# Patient Record
Sex: FEMALE | Race: BLACK OR AFRICAN AMERICAN | NOT HISPANIC OR LATINO | Employment: OTHER | URBAN - METROPOLITAN AREA
[De-identification: names, ages, dates, MRNs, and addresses within clinical notes are randomized per-mention and may not be internally consistent; named-entity substitution may affect disease eponyms.]

---

## 2018-03-06 ENCOUNTER — OFFICE VISIT (OUTPATIENT)
Dept: RHEUMATOLOGY | Facility: CLINIC | Age: 58
End: 2018-03-06
Payer: COMMERCIAL

## 2018-03-06 VITALS
HEIGHT: 66 IN | WEIGHT: 234 LBS | DIASTOLIC BLOOD PRESSURE: 108 MMHG | BODY MASS INDEX: 37.61 KG/M2 | SYSTOLIC BLOOD PRESSURE: 152 MMHG

## 2018-03-06 DIAGNOSIS — Z79.899 ENCOUNTER FOR LONG-TERM (CURRENT) DRUG USE: ICD-10-CM

## 2018-03-06 DIAGNOSIS — M19.90 OSTEOARTHRITIS, UNSPECIFIED OSTEOARTHRITIS TYPE, UNSPECIFIED SITE: ICD-10-CM

## 2018-03-06 DIAGNOSIS — M79.7 FIBROMYALGIA: Primary | ICD-10-CM

## 2018-03-06 LAB
ALBUMIN SERPL-MCNC: 4.7 G/DL (ref 3.1–4.7)
ALP SERPL-CCNC: 48 IU/L (ref 40–104)
ALT (SGPT): 23 IU/L (ref 3–33)
AST SERPL-CCNC: 19 IU/L (ref 10–40)
BASOPHILS NFR BLD: 0 K/UL (ref 0–0.2)
BASOPHILS NFR BLD: 0.6 %
BILIRUB SERPL-MCNC: 0.5 MG/DL (ref 0.3–1)
BUN SERPL-MCNC: 15 MG/DL (ref 8–20)
CALCIUM SERPL-MCNC: 9.7 MG/DL (ref 7.7–10.4)
CHLORIDE: 105 MMOL/L (ref 98–110)
CK SERPL-CCNC: 230 IU/L (ref 13–135)
CO2 SERPL-SCNC: 26.1 MMOL/L (ref 22.8–31.6)
CREATININE: 0.65 MG/DL (ref 0.6–1.4)
EOSINOPHIL NFR BLD: 0.1 K/UL (ref 0–0.7)
EOSINOPHIL NFR BLD: 1.4 %
ERYTHROCYTE [DISTWIDTH] IN BLOOD BY AUTOMATED COUNT: 12.8 % (ref 11.7–14.9)
GLUCOSE: 79 MG/DL (ref 70–99)
GRAN #: 3.6 K/UL (ref 1.4–6.5)
GRAN%: 56.5 %
HCT VFR BLD AUTO: 40.9 % (ref 36–48)
HGB BLD-MCNC: 13.6 G/DL (ref 12–15)
IMMATURE GRANS (ABS): 0 K/UL (ref 0–1)
IMMATURE GRANULOCYTES: 0.3 %
LYMPH #: 2.1 K/UL (ref 1.2–3.4)
LYMPH%: 33.3 %
MCH RBC QN AUTO: 31.6 PG (ref 25–35)
MCHC RBC AUTO-ENTMCNC: 33.3 G/DL (ref 31–36)
MCV RBC AUTO: 94.9 FL (ref 79–98)
MONO #: 0.5 K/UL (ref 0.1–0.6)
MONO%: 7.9 %
NUCLEATED RBCS: 0 %
PLATELET # BLD AUTO: 272 K/UL (ref 140–440)
PMV BLD AUTO: 11 FL (ref 8.8–12.7)
POTASSIUM SERPL-SCNC: 4.2 MMOL/L (ref 3.5–5)
PROT SERPL-MCNC: 8 G/DL (ref 6–8.2)
RBC # BLD AUTO: 4.31 M/UL (ref 3.5–5.5)
SODIUM: 139 MMOL/L (ref 134–144)
TSH SERPL DL<=0.005 MIU/L-ACNC: 1.55 ULU/ML (ref 0.3–5.6)
WBC # BLD AUTO: 6.3 K/UL (ref 5–10)

## 2018-03-06 PROCEDURE — 99204 OFFICE O/P NEW MOD 45 MIN: CPT | Mod: ,,, | Performed by: INTERNAL MEDICINE

## 2018-03-06 RX ORDER — OMEPRAZOLE 20 MG/1
20 CAPSULE, DELAYED RELEASE ORAL
COMMUNITY
End: 2018-08-14

## 2018-03-06 RX ORDER — VALSARTAN 320 MG/1
320 TABLET ORAL
COMMUNITY
End: 2018-08-14

## 2018-03-06 RX ORDER — CYCLOBENZAPRINE HCL 10 MG
10 TABLET ORAL 3 TIMES DAILY PRN
COMMUNITY

## 2018-03-06 RX ORDER — ESCITALOPRAM OXALATE 10 MG/1
10 TABLET ORAL
COMMUNITY
End: 2018-08-14 | Stop reason: SDUPTHER

## 2018-03-06 RX ORDER — LORAZEPAM 1 MG/1
1 TABLET ORAL EVERY 4 HOURS PRN
COMMUNITY
End: 2018-08-14

## 2018-03-06 RX ORDER — OXYCODONE AND ACETAMINOPHEN 10; 325 MG/1; MG/1
1 TABLET ORAL
COMMUNITY
End: 2019-10-23

## 2018-03-06 RX ORDER — METOPROLOL SUCCINATE 50 MG/1
50 TABLET, EXTENDED RELEASE ORAL
COMMUNITY
End: 2018-03-29 | Stop reason: SDUPTHER

## 2018-03-06 RX ORDER — TRAZODONE HYDROCHLORIDE 50 MG/1
50 TABLET ORAL NIGHTLY PRN
COMMUNITY
End: 2018-03-29

## 2018-03-06 RX ORDER — IBUPROFEN 800 MG/1
800 TABLET ORAL
COMMUNITY
End: 2018-03-29

## 2018-03-06 NOTE — PATIENT INSTRUCTIONS
Fibromyalgia  Fibromyalgia is a chronic condition. It causes pain and tenderness in connective tissues. This causes muscle pain. Often, there are also many tender areas throughout the body. Symptoms may also include stiffness and feelings of numbness and tingling. Symptoms may be worse upon waking up. They may increase with poor sleep, heavy activity, cold or damp weather, anxiety, or stress.  People with fibromyalgia often feel tired. They may have trouble sleeping. Other symptoms include morning stiffness, headaches, and painful menstrual periods. Some people have problems with thinking clearly and changes in memory.  The cause of fibromyalgia is not known. Symptoms are similar to that of other diseases. These include rheumatoid arthritis, low thyroid, chronic fatigue syndrome, and Lyme disease. In some cases, these diseases may occur together.  Fibromyalgia is often treated with medicines. You and your healthcare provider can discuss the medicine that may work best for you. You may have to try more than one medicine or combination of medicines before you find what works for you.  Home care  · If your healthcare provider has prescribed or recommended medicines, take them as directed.  · Rest as needed. Try to get enough sleep. If you have trouble sleeping, discuss this with your healthcare provider.   · Be active. Regular exercise can help manage symptoms. Some options include walking, swimming, and biking. Strengthening exercises may also be helpful. Talk to your healthcare provider about the best ways to be active.  · Follow a healthy diet. Limit caffeine and alcohol. If you smoke, ask your healthcare provider for help to stop.  · Notice how your body reacts to stress. Learn to listen to your body signals. This will help you take action before the stress becomes severe.  · Learn relaxation techniques. Also consider joining a stress reduction program or class.  · Talk to your healthcare provider about trying  complementary treatments. These include acupuncture, hypnosis, and biofeedback. Yoga and neymar chi may be helpful.  · Ask your healthcare provider about cognitive behavioral therapy (CBT). This type of counseling can help people with fibromyalgia cope better with their illness.  Follow-up care  Follow up with your healthcare provider or as advised by our staff. In many cases, fibromyalgia is best treated with a team approach.  This may involve your primary care provider, a rheumatologist, a physical therapist, and a mental health professional.   For more information: National [place] of Arthritis and Musculoskeletal and Skin Diseases (NIAMS)  www.niams.nih.gov 177-826-7520  When to seek medical advice  Contact your healthcare provider right away if any of these occur:  · Symptoms getting worse or new symptoms developing  · You feel hopeless, helpless, or lose interest in day-to-day life  Date Last Reviewed: 3/1/2017  © 9799-9266 [employer]. [address] [place] [address], [place] [address]. All rights reserved. This information is not intended as a substitute for professional medical care. Always follow your healthcare professional's instructions.

## 2018-03-06 NOTE — PROGRESS NOTES
[facility] RHEUMATOLOGY           New patient visit      Subjective:       Patient ID:   NAME: Urszula Dupree : 1960     57 y.o. female    [facility] Doc: [facility]  Other Physicians:    Chief Complaint:  Pain (Chronic Pain)        HPI:    This patient has been treated for more than 10 years for fibromyalgia. She was seeing a rheumatologist in [place]. She accurately describe issues surrounding fibromyalgia such as mood disorder, sleep problems, constant migratory pain, and fatigue. She has been treated with Lexapro and trazodone over the last year or so and feels that it has worked well. She would like to continue her current treatment.  Her past medical history is significant for hypertension and reflux. She also has had chronic anxiety    ROS:   GEN:      no fever, night sweats or weight loss  SKIN:     no rashes , erythema, bruising, or swelling, no Raynauds, no photosensitivity  HEENT: no HAs, no changes in vision, no mouth ulcers, no sicca symptoms, no scalp tenderness, jaw claudication.  CV:        no CP, SOB, PND, WILLIAMSON or orthopnea,no palpitations  PULM:   no SOB, cough, hemoptysis, sputum or pleuritic pain  GI:         no abdominal pain, nausea, vomiting, constipation, diarrhea, melanotic stools, BRBPR, or hematemesis.no dysphagia  :       no hematuria, dysuria  NEURO:no paresthesias, headaches, visual disturbances, muscle weakness  MUSCULOSKELETAL:  Intermittent migratory myalgias and arthralgias  PSYCH:  +/- insomnia, no significant depression, no anxiety    Medications:    Current Outpatient Prescriptions:     cyclobenzaprine (FLEXERIL) 10 MG tablet, Take 10 mg by mouth 3 (three) times daily as needed for Muscle spasms., Disp: , Rfl:     escitalopram oxalate (LEXAPRO) 10 MG tablet, Take 10 mg by mouth., Disp: , Rfl:     ibuprofen (ADVIL,MOTRIN) 800 MG tablet, Take 800 mg by mouth 2 times daily 2 hours after meal., Disp: , Rfl:     LORazepam (ATIVAN) 1 MG tablet, Take 1 mg by  "mouth every 4 (four) hours as needed for Anxiety., Disp: , Rfl:     metoprolol succinate (TOPROL-XL) 50 MG 24 hr tablet, Take 50 mg by mouth., Disp: , Rfl:     omeprazole (PRILOSEC) 20 MG capsule, Take 20 mg by mouth., Disp: , Rfl:     oxyCODONE-acetaminophen (PERCOCET)  mg per tablet, Take 1 tablet by mouth., Disp: , Rfl:     ranitidine (ZANTAC) 300 MG tablet, Take 300 mg by mouth every evening., Disp: , Rfl:     traZODone (DESYREL) 50 MG tablet, Take 50 mg by mouth nightly as needed for Insomnia., Disp: , Rfl:     valsartan (DIOVAN) 320 MG tablet, Take 320 mg by mouth., Disp: , Rfl:     FAMILY HISTORY: negative for Connective Tissue Disease        Review of patient's allergies indicates:   Allergen Reactions    Codeine Nausea Only     She states it upsets her stomach.              Objective:     Vitals:  Blood pressure (!) 152/108, height 5' 6" (1.676 m), weight 106.1 kg (234 lb).    Physical Examination:   GEN:    wn/wd female in no apparent distress  SKIN:    no rashes, no lesions, no sclerodactyly, no Raynaud's, no periungual erythema  HEAD:   no alopecia, no scalp tenderness, no temporal artery tenderness or induration.  EYES:   no pallor, no icterus, PERRLA  ENT:     no thrush, no mucosal dryness or ulcerations, adequate oral hygiene & dentition.  NECK:  supple x 6, no masses, no thyromegaly, no lymphadenopathy.  CV:        S1 and S2 regular, no murmurs, gallop or rubs  CHEST: Normal respiratory effort;  normal breath sounds/no adventitious sounds. No signs of consolidation.  ABD:      non-tender and non-distended; soft; normal bowel sounds; no rebound/guarding or tenderness. No hepatosplenomegaly.  Musculoskeletal:  No evidence of inflammatory arthritis. No significant degenerative changes. Trigger points are reactive in 7 of 8 tested locations. Strength is good, posture is good in the gait is stable.  EXTREM: no clubbing, cyanosis or edema. normal pulses.  NEURO: grossly intact; motor/sensory " WNL; AAOx3; no tremors  PSYCH:  normal mood, affect and behavior            Labs:   No results found for: WBC, HGB, HCT, MCV, PLTCMP@  No results found for: NA, K, CL, CO2, GLU, BUN, CREATININE, CALCIUM, PROT, ALBUMIN, BILITOT, ALKPHOS, AST, ALT, CPK, CRP, KORI, RF, URICACID      Radiology/Diagnostic Studies:    none    Assessment/Discussion/Plan:   57 y.o. female with fibromyalgia, chronic-good control on Lexapro and trazodone      PLAN:  I will continue her medication without change. I have informed her that I will assume responsibility for the medications she takes for fibromyalgia though I will not prescribe narcotics. She takes these extremely rarely and was nonplussed by my comment. I have obtained all appropriate blood testing.  She has been provided with literature on fibromyalgia. We also discussed exercise and weight loss.    RTC:  I will see her back in 2-3 months.      Electronically signed by Ulices Toledo MD

## 2018-03-06 NOTE — LETTER
March 6, 2018      [facility]  [address] [address]           [facility] - Rheumatology  [address]  [address]  [place] [address]  Phone: [phone]  Fax: [phone]          Patient: Urszula Dupree   MR Number: [id number]   YOB: 1960   Date of Visit: 3/6/2018       Dear [facility]:    Thank you for referring Urzsula Dupree to me for evaluation. Attached you will find relevant portions of my assessment and plan of care.    If you have questions, please do not hesitate to call me. I look forward to following Urszula Dupree along with you.    Sincerely,    Ulices Toledo MD    Enclosure  CC:  No Recipients    If you would like to receive this communication electronically, please contact [email or web address] or [phone] to request more information on SH[employer] Link access.    For providers and/or their staff who would like to refer a patient to [facility], please contact us through our one-stop-shop provider referral line, [facility], at [phone].    If you feel you have received this communication in error or would no longer like to receive these types of communications, please e-mail [email or web address]

## 2018-03-07 LAB — RHEUMATOID FACT SERPL-ACNC: <10 IU/ML (ref 0–13.9)

## 2018-03-27 ENCOUNTER — TELEPHONE (OUTPATIENT)
Dept: RHEUMATOLOGY | Facility: CLINIC | Age: 58
End: 2018-03-27

## 2018-03-29 ENCOUNTER — OFFICE VISIT (OUTPATIENT)
Dept: RHEUMATOLOGY | Facility: CLINIC | Age: 58
End: 2018-03-29
Payer: COMMERCIAL

## 2018-03-29 VITALS
BODY MASS INDEX: 38.22 KG/M2 | DIASTOLIC BLOOD PRESSURE: 98 MMHG | SYSTOLIC BLOOD PRESSURE: 148 MMHG | WEIGHT: 236.81 LBS

## 2018-03-29 DIAGNOSIS — M79.10 MYALGIA: ICD-10-CM

## 2018-03-29 DIAGNOSIS — M79.10 MYALGIA: Primary | ICD-10-CM

## 2018-03-29 DIAGNOSIS — M19.90 OSTEOARTHRITIS, UNSPECIFIED OSTEOARTHRITIS TYPE, UNSPECIFIED SITE: ICD-10-CM

## 2018-03-29 DIAGNOSIS — M79.7 FIBROMYALGIA: Primary | ICD-10-CM

## 2018-03-29 PROCEDURE — 99213 OFFICE O/P EST LOW 20 MIN: CPT | Mod: ,,, | Performed by: INTERNAL MEDICINE

## 2018-03-29 RX ORDER — METOPROLOL TARTRATE 50 MG/1
50 TABLET ORAL
COMMUNITY
End: 2020-02-13 | Stop reason: DRUGHIGH

## 2018-03-29 RX ORDER — CHLORTHALIDONE 25 MG/1
25 TABLET ORAL
COMMUNITY
End: 2020-02-13 | Stop reason: DRUGHIGH

## 2018-03-29 RX ORDER — DICLOFENAC SODIUM AND MISOPROSTOL 75; 200 MG/1; UG/1
1 TABLET, DELAYED RELEASE ORAL 2 TIMES DAILY
Qty: 180 TABLET | Refills: 1 | Status: SHIPPED | OUTPATIENT
Start: 2018-03-29 | End: 2019-07-22

## 2018-03-29 RX ORDER — ONDANSETRON 4 MG/1
TABLET, ORALLY DISINTEGRATING ORAL
COMMUNITY
Start: 2018-02-22 | End: 2019-10-23

## 2018-03-29 NOTE — PROGRESS NOTES
[facility] RHEUMATOLOGY           Follow-up visit      Subjective:       Patient ID:   NAME: Urszula Dupree : 1960     57 y.o. female    Referring Doc: [facility]  Other Physicians:    Chief Complaint:  Fibromyalgia      HPI/Interval History:   The patient does not find she gets adequate relief at this point from ibuprofen. She was asking for a replacement medication. The pain is mostly muscular and involves the shoulders and upper cervical region. She does not have any paresthesias or other neurological phenomena. She has not had any red, hot, and/or swollen joints.          ROS:   GEN:    no fever, night sweats or weight loss  SKIN:   no rashes , erythema, bruising, or swelling, no Raynauds, no photosensitivity  HEENT: no HAs, no changes in vision, no mouth ulcers, no sicca symptoms, no scalp tenderness, jaw claudication.  CV:      no CP, SOB, PND, WILLIAMSON or orthopnea,no palpitations  PULM: no SOB, cough, hemoptysis, sputum or pleuritic pain  GI:      no abdominal pain, nausea, vomiting, constipation, diarrhea, melanotic stools, BRBPR, or hematemesis, no dysphagia, no GERD  :     no hematuria, dysuria  NEURO: no paresthesias, headaches, visual disturbances  MUSCULOSKELETAL:  Muscular pain as above  PSYCH:   No insomnia, no significant depression, no anxiety    Medications:    Current Outpatient Prescriptions:     chlorthalidone (HYGROTEN) 25 MG Tab, Take 25 mg by mouth., Disp: , Rfl:     cyclobenzaprine (FLEXERIL) 10 MG tablet, Take 10 mg by mouth 3 (three) times daily as needed for Muscle spasms., Disp: , Rfl:     escitalopram oxalate (LEXAPRO) 10 MG tablet, Take 10 mg by mouth., Disp: , Rfl:     LORazepam (ATIVAN) 1 MG tablet, Take 1 mg by mouth every 4 (four) hours as needed for Anxiety., Disp: , Rfl:     metoprolol tartrate (LOPRESSOR) 50 MG tablet, Take 50 mg by mouth., Disp: , Rfl:     naloxegol (MOVANTIK) tablet, Take by mouth., Disp: , Rfl:     omeprazole (PRILOSEC) 20 MG capsule,  Take 20 mg by mouth., Disp: , Rfl:     ondansetron (ZOFRAN-ODT) 4 MG TbDL, , Disp: , Rfl:     oxyCODONE-acetaminophen (PERCOCET)  mg per tablet, Take 1 tablet by mouth., Disp: , Rfl:     ranitidine (ZANTAC) 300 MG tablet, Take 300 mg by mouth every evening., Disp: , Rfl:     valsartan (DIOVAN) 320 MG tablet, Take 320 mg by mouth., Disp: , Rfl:     diclofenac-misoprostol  mg-mcg (ARTHROTEC 75)  mg-mcg per tablet, Take 1 tablet by mouth 2 (two) times daily., Disp: 180 tablet, Rfl: 1      FAMILY HISTORY: negative for Connective Tissue Disease        Review of patient's allergies indicates:   Allergen Reactions    Codeine Nausea Only     She states it upsets her stomach.              Objective:     Vitals:  Blood pressure (!) 148/98, weight 107.4 kg (236 lb 12.8 oz).    Physical Examination:   GEN: wn/wd female in no apparent distress  SKIN: no rashes, no lesions, no sclerodactyly, no Raynaud's, no periungual erythema  HEAD: no alopecia, no scalp tenderness, no temporal artery tenderness or induration.  EYES: no pallor, no icterus, PERRLA  ENT:  no thrush, no mucosal dryness or ulcerations, adequate oral hygiene & dentition.  NECK: supple x 6, no masses, no thyromegaly, no lymphadenopathy.  CV:   S1 and S2 regular, no murmurs, gallop or rubs  CHEST: Normal respiratory effort;  normal breath sounds/no adventitious sounds. No signs of consolidation.  ABD: non-tender and non-distended; soft; normal bowel sounds; no rebound/guarding or tenderness. No hepatosplenomegaly.  Musculoskeletal:    EXTREM: no clubbing, cyanosis or edema. normal pulses.  NEURO: grossly intact; motor/sensory WNL; AAOx3; no tremors  PSYCH:  normal mood, affect and behavior            Labs:   Lab Results   Component Value Date    WBC 6.3 03/06/2018    HGB 13.6 03/06/2018    HCT 40.9 03/06/2018    MCV 94.9 03/06/2018     03/06/2018   CMP@  Sodium   Date Value Ref Range Status   03/06/2018 139 134 - 144 mmol/L       Potassium   Date Value Ref Range Status   03/06/2018 4.2 3.5 - 5.0 mmol/L      Chloride   Date Value Ref Range Status   03/06/2018 105 98 - 110 mmol/L      CO2   Date Value Ref Range Status   03/06/2018 26.1 22.8 - 31.6 mmol/L      Glucose   Date Value Ref Range Status   03/06/2018 79 70 - 99 mg/dL      BUN, Bld   Date Value Ref Range Status   03/06/2018 15 8 - 20 mg/dL      Creatinine   Date Value Ref Range Status   03/06/2018 0.65 0.60 - 1.40 mg/dL      Calcium   Date Value Ref Range Status   03/06/2018 9.7 7.7 - 10.4 mg/dL      Total Protein   Date Value Ref Range Status   03/06/2018 8.0 6.0 - 8.2 g/dL      Albumin   Date Value Ref Range Status   03/06/2018 4.7 3.1 - 4.7 g/dL      Total Bilirubin   Date Value Ref Range Status   03/06/2018 0.5 0.3 - 1.0 mg/dL      Alkaline Phosphatase   Date Value Ref Range Status   03/06/2018 48 40 - 104 IU/L      AST   Date Value Ref Range Status   03/06/2018 19 10 - 40 IU/L      CPK   Date Value Ref Range Status   03/06/2018 230 (H) 13 - 135 IU/L      Rheumatoid Factor   Date Value Ref Range Status   03/06/2018 <10.0 0.0 - 13.9 IU/mL      Comment:     Performed at: [facility], [facility] [address], [id number] Pace MD, Phone:  [phone]         Radiology/Diagnostic Studies:    none    Assessment/Discussion/Plan:   57 y.o. female with fibromyalgia-incomplete relief with current regimen      PLAN:  I'm going to change her ibuprofen to Arthrotec 75 mg twice daily. In part, this is due to her past history of a gastric ulcer many years ago.  I note that her CPK level is elevated. I'm going to repeat that along with a serum aldolase level.      RTC:  I will see her back in 2 months or sooner if needed      Electronically signed by Ulices Toledo MD

## 2018-03-29 NOTE — LETTER
March 29, 2018      [facility]  [address] [address]           [facility] Rheumatology  [address]  [address]  [place] [address]  Phone: [phone]  Fax: [phone]          Patient: Urszula Dupree   MR Number: [id number]   YOB: 1960   Date of Visit: 3/29/2018       Dear [facility]:    Thank you for referring Urszula Dupree to me for evaluation. Attached you will find relevant portions of my assessment and plan of care.    If you have questions, please do not hesitate to call me. I look forward to following Urszula Dupree along with you.    Sincerely,    Ulices Toledo MD    Enclosure  CC:  No Recipients    If you would like to receive this communication electronically, please contact [email or web address] or [phone] to request more information on [employer] Link access.    For providers and/or their staff who would like to refer a patient to [facility], please contact us through our one-stop-shop provider referral line, [facility], at [phone].    If you feel you have received this communication in error or would no longer like to receive these types of communications, please e-mail [email or web address]

## 2018-04-16 ENCOUNTER — TELEPHONE (OUTPATIENT)
Dept: RHEUMATOLOGY | Facility: CLINIC | Age: 58
End: 2018-04-16

## 2018-04-16 NOTE — TELEPHONE ENCOUNTER
Patient c/o swollen hands, whole body hurts and arthrotec not working. Requests something else for pain.

## 2018-04-16 NOTE — TELEPHONE ENCOUNTER
I called patient to let her know about the medrol dosepak. Patient states she is on her way to the ER. She will call us and let us know what they do for her.

## 2018-04-16 NOTE — TELEPHONE ENCOUNTER
Patient states Arthrotec is not helping. Her hands are swollen and her whole body hurts. Requests different med for pain.

## 2018-04-16 NOTE — TELEPHONE ENCOUNTER
Patient states she is to the point where her quality of life is compromised.  Fingers, shoulders, upper body every thing hurts, to where she can't stay up too long. She doesn't go out due to the pain

## 2018-06-12 ENCOUNTER — OFFICE VISIT (OUTPATIENT)
Dept: RHEUMATOLOGY | Facility: CLINIC | Age: 58
End: 2018-06-12
Payer: COMMERCIAL

## 2018-06-12 VITALS
SYSTOLIC BLOOD PRESSURE: 118 MMHG | BODY MASS INDEX: 38.46 KG/M2 | WEIGHT: 238.31 LBS | DIASTOLIC BLOOD PRESSURE: 72 MMHG

## 2018-06-12 DIAGNOSIS — M79.7 FIBROMYALGIA: Primary | ICD-10-CM

## 2018-06-12 DIAGNOSIS — Z79.899 ENCOUNTER FOR LONG-TERM (CURRENT) DRUG USE: ICD-10-CM

## 2018-06-12 DIAGNOSIS — M19.90 OSTEOARTHRITIS, UNSPECIFIED OSTEOARTHRITIS TYPE, UNSPECIFIED SITE: ICD-10-CM

## 2018-06-12 LAB
ALBUMIN SERPL-MCNC: 4.2 G/DL (ref 3.1–4.7)
ALP SERPL-CCNC: 46 IU/L (ref 40–104)
ALT (SGPT): 40 IU/L (ref 3–33)
AST SERPL-CCNC: 34 IU/L (ref 10–40)
BASOPHILS NFR BLD: 0 K/UL (ref 0–0.2)
BASOPHILS NFR BLD: 0.8 %
BILIRUB SERPL-MCNC: 0.7 MG/DL (ref 0.3–1)
BUN SERPL-MCNC: 11 MG/DL (ref 8–20)
CALCIUM SERPL-MCNC: 9.5 MG/DL (ref 7.7–10.4)
CHLORIDE: 106 MMOL/L (ref 98–110)
CO2 SERPL-SCNC: 26.8 MMOL/L (ref 22.8–31.6)
CREATININE: 0.72 MG/DL (ref 0.6–1.4)
CRP SERPL-MCNC: 0.28 MG/DL (ref 0–1.4)
EOSINOPHIL NFR BLD: 0.2 K/UL (ref 0–0.7)
EOSINOPHIL NFR BLD: 3.4 %
ERYTHROCYTE [DISTWIDTH] IN BLOOD BY AUTOMATED COUNT: 13.4 % (ref 11.7–14.9)
GLUCOSE: 99 MG/DL (ref 70–99)
GRAN #: 2.4 K/UL (ref 1.4–6.5)
GRAN%: 48.2 %
HCT VFR BLD AUTO: 39.4 % (ref 36–48)
HGB BLD-MCNC: 13.1 G/DL (ref 12–15)
IMMATURE GRANS (ABS): 0 K/UL (ref 0–1)
IMMATURE GRANULOCYTES: 0.2 %
LYMPH #: 1.9 K/UL (ref 1.2–3.4)
LYMPH%: 37.9 %
MCH RBC QN AUTO: 31.6 PG (ref 25–35)
MCHC RBC AUTO-ENTMCNC: 33.2 G/DL (ref 31–36)
MCV RBC AUTO: 94.9 FL (ref 79–98)
MONO #: 0.5 K/UL (ref 0.1–0.6)
MONO%: 9.5 %
NUCLEATED RBCS: 0 %
PLATELET # BLD AUTO: 241 K/UL (ref 140–440)
PMV BLD AUTO: 11.4 FL (ref 8.8–12.7)
POTASSIUM SERPL-SCNC: 4.3 MMOL/L (ref 3.5–5)
PROT SERPL-MCNC: 7.3 G/DL (ref 6–8.2)
RBC # BLD AUTO: 4.15 M/UL (ref 3.5–5.5)
SODIUM: 139 MMOL/L (ref 134–144)
WBC # BLD AUTO: 4.9 K/UL (ref 5–10)

## 2018-06-12 PROCEDURE — 99213 OFFICE O/P EST LOW 20 MIN: CPT | Mod: ,,, | Performed by: INTERNAL MEDICINE

## 2018-06-12 RX ORDER — METHYLPREDNISOLONE 4 MG/1
TABLET ORAL
Qty: 1 PACKAGE | Refills: 0 | Status: SHIPPED | OUTPATIENT
Start: 2018-06-12 | End: 2018-07-03

## 2018-06-12 NOTE — PROGRESS NOTES
[facility] RHEUMATOLOGY           Follow-up visit    Notes dictated via Dragon to EPIC. Please forgive any unintentional errors.  Subjective:       Patient ID:   NAME: Urszula Dupree : 1960     57 y.o. female    Referring Doc: [facility]  Other Physicians:    Chief Complaint:  Fibromyalgia and Osteoarthritis      HPI/Interval History:   The patient is doing well. She does have some pain in the right thumb that does not notice any very significant swelling. There is tenderness though in pain when she attempts routine activities.          ROS:   GEN:    no fever, night sweats or weight loss  SKIN:   no rashes , erythema, bruising, or swelling, no Raynauds, no photosensitivity  HEENT: no changes in vision, no mouth ulcers, no sicca symptoms, no scalp tenderness, no jaw claudication.  CV:      no CP, PND, WILLIAMSON or orthopnea, no palpitations  PULM: no SOB, cough, hemoptysis, sputum or pleuritic pain  GI:       no abdominal pain, nausea, vomiting, constipation, diarrhea, melanotic stools, BRBPR, or hematemesis, no dysphagia, no GERD  :     no hematuria, dysuria  NEURO: no paresthesias, headaches, acute visual disturbances  MUSCULOSKELETAL:  Thumb pain only at the IP without other joint involvement  PSYCH:   No insomnia, no significant anxiety or depression    Medications:    Current Outpatient Prescriptions:     chlorthalidone (HYGROTEN) 25 MG Tab, Take 25 mg by mouth., Disp: , Rfl:     cyclobenzaprine (FLEXERIL) 10 MG tablet, Take 10 mg by mouth 3 (three) times daily as needed for Muscle spasms., Disp: , Rfl:     diclofenac-misoprostol  mg-mcg (ARTHROTEC 75)  mg-mcg per tablet, Take 1 tablet by mouth 2 (two) times daily., Disp: 180 tablet, Rfl: 1    escitalopram oxalate (LEXAPRO) 10 MG tablet, Take 10 mg by mouth., Disp: , Rfl:     LORazepam (ATIVAN) 1 MG tablet, Take 1 mg by mouth every 4 (four) hours as needed for Anxiety., Disp: , Rfl:     metoprolol tartrate (LOPRESSOR) 50 MG tablet,  Take 50 mg by mouth., Disp: , Rfl:     naloxegol (MOVANTIK) tablet, Take by mouth., Disp: , Rfl:     omeprazole (PRILOSEC) 20 MG capsule, Take 20 mg by mouth., Disp: , Rfl:     ondansetron (ZOFRAN-ODT) 4 MG TbDL, , Disp: , Rfl:     oxyCODONE-acetaminophen (PERCOCET)  mg per tablet, Take 1 tablet by mouth., Disp: , Rfl:     ranitidine (ZANTAC) 300 MG tablet, Take 300 mg by mouth every evening., Disp: , Rfl:     valsartan (DIOVAN) 320 MG tablet, Take 320 mg by mouth., Disp: , Rfl:     methylPREDNISolone (MEDROL DOSEPACK) 4 mg tablet, use as directed, Disp: 1 Package, Rfl: 0      FAMILY HISTORY: negative for Connective Tissue Disease        Review of patient's allergies indicates:   Allergen Reactions    Codeine Nausea Only     She states it upsets her stomach.              Objective:     Vitals:  Blood pressure 118/72, weight 108.1 kg (238 lb 4.8 oz).    Physical Examination:   GEN: wn/wd female in no apparent distress  SKIN: no rashes, no lesions, no sclerodactyly, no Raynaud's, no periungual erythema  HEAD: no alopecia, no scalp tenderness, no temporal artery tenderness or induration.  EYES: no pallor, no icterus, PERRLA  ENT:  no thrush, no mucosal dryness or ulcerations, adequate oral hygiene & dentition.  NECK: supple x 6, no masses, no thyromegaly, no lymphadenopathy.  CV:   S1 and S2 regular, no murmurs, gallop or rubs  CHEST: Normal respiratory effort;  normal breath sounds/no adventitious sounds. No signs of consolidation.  ABD: non-tender and non-distended; soft; normal bowel sounds; no rebound/guarding or tenderness. No hepatosplenomegaly.  Musculoskeletal:  No obvious inflammatory arthritis is present. No triggering is noted. Range of motion is full  strength is normal.  EXTREM: no clubbing, cyanosis or edema. normal pulses.  NEURO: grossly intact; motor/sensory WNL; no tremors  PSYCH:  normal mood, affect and behavior            Labs:   Lab Results   Component Value Date    WBC 6.3  03/06/2018    HGB 13.6 03/06/2018    HCT 40.9 03/06/2018    MCV 94.9 03/06/2018     03/06/2018   CMP@  Sodium   Date Value Ref Range Status   03/06/2018 139 134 - 144 mmol/L      Potassium   Date Value Ref Range Status   03/06/2018 4.2 3.5 - 5.0 mmol/L      Chloride   Date Value Ref Range Status   03/06/2018 105 98 - 110 mmol/L      CO2   Date Value Ref Range Status   03/06/2018 26.1 22.8 - 31.6 mmol/L      Glucose   Date Value Ref Range Status   03/06/2018 79 70 - 99 mg/dL      BUN, Bld   Date Value Ref Range Status   03/06/2018 15 8 - 20 mg/dL      Creatinine   Date Value Ref Range Status   03/06/2018 0.65 0.60 - 1.40 mg/dL      Calcium   Date Value Ref Range Status   03/06/2018 9.7 7.7 - 10.4 mg/dL      Total Protein   Date Value Ref Range Status   03/06/2018 8.0 6.0 - 8.2 g/dL      Albumin   Date Value Ref Range Status   03/06/2018 4.7 3.1 - 4.7 g/dL      Total Bilirubin   Date Value Ref Range Status   03/06/2018 0.5 0.3 - 1.0 mg/dL      Alkaline Phosphatase   Date Value Ref Range Status   03/06/2018 48 40 - 104 IU/L      AST   Date Value Ref Range Status   03/06/2018 19 10 - 40 IU/L      CPK   Date Value Ref Range Status   03/06/2018 230 (H) 13 - 135 IU/L      Rheumatoid Factor   Date Value Ref Range Status   03/06/2018 <10.0 0.0 - 13.9 IU/mL      Comment:     Performed at: [facility], [facility] [address], [id number] Pace MD, Phone:  [phone]         Radiology/Diagnostic Studies:    none    Assessment/Discussion/Plan:   57 y.o. female with fibromyalgia and osteoarthritis-adequate control on current regimen of Arthrotec, Lexapro and Flexeril.      PLAN:  I will continue her medication without change. Routine blood testing was obtained. She asked for a Medrol Dosepak for a cruise she will be taking in the next few weeks. I wrote a prescription for her for that.      RTC:  I will see her back in 4 months.      Electronically signed by Ulices Toledo,  MD

## 2018-06-12 NOTE — LETTER
June 12, 2018      [facility]  [address] [place] [address]           [facility] - Rheumatology  [address] [facility]  [address] [place] [address]  Phone: [phone]  Fax: [phone]          Patient: Urszula Dupree   MR Number: [id number]   YOB: 1960   Date of Visit: 6/12/2018       Dear [facility]:    Thank you for referring rUszula Dupree to me for evaluation. Attached you will find relevant portions of my assessment and plan of care.    If you have questions, please do not hesitate to call me. I look forward to following Urszula Dupree along with you.    Sincerely,    Ulices Toledo MD    Enclosure  CC:  No Recipients    If you would like to receive this communication electronically, please contact [email or web address] or [phone] to request more information on [facility] access.    For providers and/or their staff who would like to refer a patient to [facility], please contact us through our one-stop-shop provider referral line, [facility], at [phone].    If you feel you have received this communication in error or would no longer like to receive these types of communications, please e-mail [email or web address]

## 2018-06-13 LAB — RHEUMATOID FACT SERPL-ACNC: <10 IU/ML (ref 0–13.9)

## 2018-08-08 ENCOUNTER — TELEPHONE (OUTPATIENT)
Dept: RHEUMATOLOGY | Facility: CLINIC | Age: 58
End: 2018-08-08

## 2018-08-08 DIAGNOSIS — R53.83 FATIGUE, UNSPECIFIED TYPE: ICD-10-CM

## 2018-08-08 DIAGNOSIS — M79.10 MYALGIA: Primary | ICD-10-CM

## 2018-08-08 DIAGNOSIS — R53.1 WEAKNESS GENERALIZED: ICD-10-CM

## 2018-08-08 NOTE — TELEPHONE ENCOUNTER
Patient notified of need to have labs done and have f/u appt next week.   Labs ordered. Patient scheduled for 8/14/18.  [facility] [place]. Order faxed to them.

## 2018-08-08 NOTE — TELEPHONE ENCOUNTER
Patient reports she went to the ER twice. Her entire body hurts, and is weak. Walking is difficult.    She was given flexeril,  prednisone 20 mg x 5 days at one visit. And robaxin , tramadol at the other visit.  Nothing is helping her. The MDP you gave her at her last visit only helped for a few days.    Please advise

## 2018-08-08 NOTE — TELEPHONE ENCOUNTER
Have her get some labs this week: CRP, TSH, CPK & aldolase.  Have her scheduled to see me next week. Thanks

## 2018-08-14 ENCOUNTER — OFFICE VISIT (OUTPATIENT)
Dept: RHEUMATOLOGY | Facility: CLINIC | Age: 58
End: 2018-08-14
Payer: COMMERCIAL

## 2018-08-14 VITALS
WEIGHT: 239.31 LBS | DIASTOLIC BLOOD PRESSURE: 96 MMHG | BODY MASS INDEX: 38.62 KG/M2 | SYSTOLIC BLOOD PRESSURE: 136 MMHG

## 2018-08-14 DIAGNOSIS — M19.90 OSTEOARTHRITIS, UNSPECIFIED OSTEOARTHRITIS TYPE, UNSPECIFIED SITE: ICD-10-CM

## 2018-08-14 DIAGNOSIS — E66.9 OBESITY, UNSPECIFIED CLASSIFICATION, UNSPECIFIED OBESITY TYPE, UNSPECIFIED WHETHER SERIOUS COMORBIDITY PRESENT: ICD-10-CM

## 2018-08-14 DIAGNOSIS — Z79.899 ENCOUNTER FOR LONG-TERM (CURRENT) DRUG USE: ICD-10-CM

## 2018-08-14 DIAGNOSIS — R53.81 PHYSICAL DECONDITIONING: ICD-10-CM

## 2018-08-14 DIAGNOSIS — M79.7 FIBROMYALGIA: Primary | ICD-10-CM

## 2018-08-14 PROCEDURE — 99214 OFFICE O/P EST MOD 30 MIN: CPT | Mod: ,,, | Performed by: INTERNAL MEDICINE

## 2018-08-14 RX ORDER — TRAZODONE HYDROCHLORIDE 100 MG/1
100 TABLET ORAL NIGHTLY
Qty: 90 TABLET | Refills: 1 | Status: SHIPPED | OUTPATIENT
Start: 2018-08-14 | End: 2019-10-23

## 2018-08-14 RX ORDER — TRAZODONE HYDROCHLORIDE 50 MG/1
50 TABLET ORAL
COMMUNITY
End: 2018-08-14 | Stop reason: SDUPTHER

## 2018-08-14 RX ORDER — ESCITALOPRAM OXALATE 10 MG/1
20 TABLET ORAL DAILY
Qty: 90 TABLET | Refills: 1 | Status: SHIPPED | OUTPATIENT
Start: 2018-08-14 | End: 2019-07-22 | Stop reason: ALTCHOICE

## 2018-08-14 NOTE — PROGRESS NOTES
[facility] RHEUMATOLOGY           Follow-up visit    Notes dictated via Dragon to EPIC. Please forgive any unintentional errors.  Subjective:       Patient ID:   NAME: Urszula Durpee : 1960     57 y.o. female    Referring Doc: Sarkis Levy [facility]  Other Physicians:    Chief Complaint:  Fibromyalgia (Takes Arthrotec 75-200mg-mcg BID)      HPI/Interval History:   The patient continues to have diffuse musculoskeletal pain. She feels Arthrotec is not very helpful. She continues with insomnia and feels that the trazodone does not help unless she takes 2 tablets (100 mg) together at night. She likewise does not feel that her Lexapro has been helpful for her anxiety.          ROS:   GEN:    no fever, night sweats or weight loss  SKIN:   no rashes , erythema, bruising, or swelling, no Raynauds, no photosensitivity  HEENT: no changes in vision, no mouth ulcers, no sicca symptoms, no scalp tenderness, no jaw claudication.  CV:      no CP, PND, WILLIAMSON or orthopnea, no palpitations  PULM: no SOB, cough, hemoptysis, sputum or pleuritic pain  GI:       no abdominal pain, nausea, vomiting, constipation, diarrhea, melanotic stools, BRBPR, or hematemesis, no dysphagia, no GERD  :     no hematuria, dysuria  NEURO: no paresthesias, headaches, acute visual disturbances  MUSCULOSKELETAL:  Diffuse myalgias and arthralgias without any red, hot, swollen joints  PSYCH:   ++ insomnia, ++ anxiety & depression    Medications:    Current Outpatient Medications:     chlorthalidone (HYGROTEN) 25 MG Tab, Take 25 mg by mouth., Disp: , Rfl:     cyclobenzaprine (FLEXERIL) 10 MG tablet, Take 10 mg by mouth 3 (three) times daily as needed for Muscle spasms., Disp: , Rfl:     diclofenac-misoprostol  mg-mcg (ARTHROTEC 75)  mg-mcg per tablet, Take 1 tablet by mouth 2 (two) times daily., Disp: 180 tablet, Rfl: 1    escitalopram oxalate (LEXAPRO) 10 MG tablet, Take 10 mg by mouth., Disp: , Rfl:     metoprolol tartrate (LOPRESSOR) 50  MG tablet, Take 50 mg by mouth., Disp: , Rfl:     ondansetron (ZOFRAN-ODT) 4 MG TbDL, , Disp: , Rfl:     oxyCODONE-acetaminophen (PERCOCET)  mg per tablet, Take 1 tablet by mouth., Disp: , Rfl:     traZODone (DESYREL) 50 MG tablet, Take 50 mg by mouth., Disp: , Rfl:   No current facility-administered medications for this visit.       FAMILY HISTORY: negative for Connective Tissue Disease        Review of patient's allergies indicates:   Allergen Reactions    Codeine Nausea Only     She states it upsets her stomach.              Objective:     Vitals:  Blood pressure (!) 136/96, weight 108.5 kg (239 lb 4.8 oz).    Physical Examination:   GEN: wn/wd female in no apparent distress  SKIN: no rashes, no lesions, no sclerodactyly, no Raynaud's, no periungual erythema  HEAD: no alopecia, no scalp tenderness, no temporal artery tenderness or induration.  EYES: no pallor, no icterus, PERRLA  ENT:  no thrush, no mucosal dryness or ulcerations, adequate oral hygiene & dentition.  NECK: supple x 6, no masses, no thyromegaly, no lymphadenopathy.  CV:   S1 and S2 regular, no murmurs, gallop or rubs  CHEST: Normal respiratory effort;  normal breath sounds/no adventitious sounds. No signs of consolidation.  ABD: non-tender and non-distended; soft; normal bowel sounds; no rebound/guarding or tenderness. No hepatosplenomegaly.  Musculoskeletal:  No evidence of active inflammatory arthritis. No progressive deformity  EXTREM: no clubbing, cyanosis or edema. normal pulses.  NEURO: grossly intact; motor/sensory WNL; no tremors  PSYCH:  normal mood, affect and behavior            Labs:   Lab Results   Component Value Date    WBC 4.9 (L) 06/12/2018    HGB 13.1 06/12/2018    HCT 39.4 06/12/2018    MCV 94.9 06/12/2018     06/12/2018   CMP@  Sodium   Date Value Ref Range Status   06/12/2018 139 134 - 144 mmol/L      Potassium   Date Value Ref Range Status   06/12/2018 4.3 3.5 - 5.0 mmol/L      Chloride   Date Value Ref Range  Status   06/12/2018 106 98 - 110 mmol/L      CO2   Date Value Ref Range Status   06/12/2018 26.8 22.8 - 31.6 mmol/L      Glucose   Date Value Ref Range Status   06/12/2018 99 70 - 99 mg/dL      BUN, Bld   Date Value Ref Range Status   06/12/2018 11 8 - 20 mg/dL      Creatinine   Date Value Ref Range Status   06/12/2018 0.72 0.60 - 1.40 mg/dL      Calcium   Date Value Ref Range Status   06/12/2018 9.5 7.7 - 10.4 mg/dL      Total Protein   Date Value Ref Range Status   06/12/2018 7.3 6.0 - 8.2 g/dL      Albumin   Date Value Ref Range Status   06/12/2018 4.2 3.1 - 4.7 g/dL      Total Bilirubin   Date Value Ref Range Status   06/12/2018 0.7 0.3 - 1.0 mg/dL      Alkaline Phosphatase   Date Value Ref Range Status   06/12/2018 46 40 - 104 IU/L      AST   Date Value Ref Range Status   06/12/2018 34 10 - 40 IU/L      CPK   Date Value Ref Range Status   03/06/2018 230 (H) 13 - 135 IU/L      CRP   Date Value Ref Range Status   06/12/2018 0.28 0.00 - 1.40 mg/dL      Rheumatoid Factor   Date Value Ref Range Status   06/12/2018 <10.0 0.0 - 13.9 IU/mL      Comment:     Performed at: [facility], [facility] [address], [id number] [place], [place], Phone:  [phone]         Radiology/Diagnostic Studies:    none    Assessment/Discussion/Plan:   57 y.o. female with Fibromyalgia secondary to underlying anxiety-depressive disorder, exacerbated by obesity and deconditioning      PLAN:  I have discussed her medications with her and we have agreed to increase Lexapro from 10 mg each morning to a new dose of 20 mg each morning. Likewise, I am going to increase trazodone from 50 mg nightly to 100 mg nightly. Caffeine avoidance was reiterated. I have also asked her to avoid Percocet as much as possible and certainly not to take any after 12 noon.    I discussed exercise with the patient and asked her to begin 5 minutes daily of exercise on a scheduled basis each day. She is to progress each month in the duration of  time she spends exercising. We also discussed calorie restrictions. Specifically, I provided her with the outline of a diet of 1200 anila daily. She seemed interested.      RTC:  I will see her back in 3-4 months.      Electronically signed by Ulices Toledo MD

## 2018-08-14 NOTE — PATIENT INSTRUCTIONS
"  MyPlate Worksheet: 1,200 Calories  Your calorie needs are about 1,200 calories a day. Below are the U.S. Department of Agriculture (USDA) guidelines for your daily recommended amount of each food group.  Vegetables  1½ cups Fruits  1 cup Grains  4 ounces Dairy  2½ cups Protein  3 ounces   Eat a variety of vegetables each day.  Aim for these amounts each week:  · 1 cup dark green vegetables  · 3 cups red or orange-colored vegetables  · ½ cup dry beans and peas  · 3½ cups starchy vegetables  · 2½ cups other vegetables Eat a variety of fruits each day.  Go easy on fruit juices.  Good choices of fruits include:  · Berries  · Bananas  · Apples  · Melon  · Dried fruit  · Frozen fruit  · Canned fruit Choose whole grains whenever you can.  Aim to eat at least 2 ounces of whole grains each day:  · Bread  · Cereal  · Rice  · Pasta  · Potatoes  · Tortillas Choose low-fat or fat-free milk, yogurt, or cheese each day.  Good choices include:  · Low-fat or fat-free milk or chocolate milk  · Low-fat or fat-free yogurt  · Low-fat or fat-free cottage cheese or other reduced-fat cheeses  · Calcium-fortified milk alternatives Choose low-fat or lean meats, poultry, fish and seafood each day.  Vary your protein. Choose more:  · Fish and other seafood  · Lean low-fat meat and poultry  · Eggs  · Beans, peas  · Tofu  · Unsalted nuts and seeds  Choose less high-fat and red meat.   Source: USDA MyPlate, www.choosemyplate.gov  Know your limits on oils (fats) and sugars:  · Your allowance for oils is 17 grams or about 4 teaspoons a day (oil includes vegetable oil, mayonnaise, soft margarine, salad dressing, nuts, olives, avocados, and some fish).  · Limit the extras (solid fats and sugars, also called "empty calories") to 100 calories a day.  · Cut back on salt (sodium). Stay under 2,300 mg sodium a day. If you have a health condition such as heart disease or high blood pressure, your doctor will likely tell you to limit sodium to no more " than 1,500 mg a day.  Get moving and be active!  Aim for at least 30 minutes of physical activity most days of the week or 150 minutes of exercise a week.  MyPlate Servings Worksheet: 1,200 calories  This worksheet tells you how many servings you should get each day from each food group, and tells you how much food makes a serving. Use this as a guide as you plan your meals throughout the day. Track your progress daily by writing in what you actually ate.  Food Group  Daily MyPlate Goal  What You Ate Today    Vegetables 3 Half-cups or 3 Servings  One serving is:  ½ cup cut-up raw or cooked vegetables  1 cup raw, leafy vegetables  ½ baked sweet potato  ½ cup vegetable juice  Note: At meals, fill half your plate with vegetables and fruit.     Fruits 2 Half-cups or 2 Servings  One serving is:  ½ cup fresh, frozen, or canned fruit  1 medium piece of fruit  1 cup of berries or melon  ½ cup dried fruit  ½ cup 100% fruit juice  Note: Make most choices fruit instead of juice.     Grains 4 Servings or 4 Ounces  One serving is:  1 slice bread  1 cup dry cereal  ½ cup cooked rice, pasta, or cereal  1 5-inch tortilla  Note: Choose whole grains for at least half of your servings each day.     Dairy 2 Servings or 2 Cups  One serving is:  1 cup milk  1½ ounces reduced-fat hard cheese  2 ounces processed cheese  1 cup low-fat yogurt  1/3 cup shredded cheese  Note: Choose low-fat or fat-free most often.     Protein 3 Servings or 3 Ounces  One serving is:  1 ounce cooked lean beef, pork, lamb, or ham  1 ounce cooked chicken or turkey (no skin)  1 ounce cooked fish or shellfish (not fried)  1 egg  ¼ cup egg substitute  ½ ounce nuts or seeds  1 tablespoon peanut or almond butter  ¼ cup cooked dry beans or peas  ½ cup tofu  2 tablespoons hummus     Date Last Reviewed: 6/1/2015  © 5554-9016 [employer]. [address] [place] [address], [place] [address]. All rights reserved. This information is not intended as a substitute for  professional medical care. Always follow your healthcare professional's instructions.        Aerobic Exercise for a Healthy Heart  Exercise is a lot more than an energy booster and a stress reliever. It also strengthens your heart muscle, lowers your blood pressure and cholesterol, and burns calories. It can also improve your resting muscle tone, and your mood.     Remember, some activity is better than none.    Choose an aerobic activity  Choose an activity that makes your heart and lungs work harder than they do when you rest or walk normally. This aerobic exercise can improve the way your heart and other muscles use oxygen. Make it fun by exercising with a friend and choosing an activity you enjoy. Here are some ideas:  · Walking  · Swimming  · Bicycling  · Stair climbing  · Dancing  · Jogging  · Gardening  Exercise regularly  If you havent been exercising regularly,  get your doctors OK first. Then start slowly.  Here are some tips:  · Begin exercising 3 times a week for 5 to 10 minutes at a time.  · When you feel comfortable, add a few minutes each session.  · Slowly build up to exercising 3 to 4 times each week. Each session should last for 40 minutes, on average, and involve moderate- to vigorous-intensity physical activity.  · If you have been given nitroglycerin, be sure to carry it when you exercise.  · If you get chest pain (angina) when youre exercising, stop what youre doing, take your nitroglycerin, and call your doctor.  Date Last Reviewed: 6/2/2016  © 0569-1647 [employer]. [address] [place] [address], [place] [address]. All rights reserved. This information is not intended as a substitute for professional medical care. Always follow your healthcare professional's instructions.

## 2018-08-14 NOTE — LETTER
August 14, 2018      [facility]  [address] [place] [address]           [facility]  [address] [facility]  [address] [place] [address]  Phone: [phone]  Fax: [phone]          Patient: Urszula Dupree   MR Number: [id number]   YOB: 1960   Date of Visit: 8/14/2018       Dear [facility]:    Thank you for referring Urszula Dupree to me for evaluation. Attached you will find relevant portions of my assessment and plan of care.    If you have questions, please do not hesitate to call me. I look forward to following Urszula Dupree along with you.    Sincerely,    Ulices Toledo MD    Enclosure  CC:  No Recipients    If you would like to receive this communication electronically, please contact [email or web address] or [phone] to request more information on [facility] access.    For providers and/or their staff who would like to refer a patient to [facility], please contact us through our one-stop-shop provider referral line, [facility], at [phone].    If you feel you have received this communication in error or would no longer like to receive these types of communications, please e-mail [email or web address]

## 2019-07-22 ENCOUNTER — OFFICE VISIT (OUTPATIENT)
Dept: RHEUMATOLOGY | Facility: CLINIC | Age: 59
End: 2019-07-22
Payer: COMMERCIAL

## 2019-07-22 VITALS — DIASTOLIC BLOOD PRESSURE: 79 MMHG | SYSTOLIC BLOOD PRESSURE: 114 MMHG | WEIGHT: 246.5 LBS | BODY MASS INDEX: 39.79 KG/M2

## 2019-07-22 DIAGNOSIS — M79.7 FIBROMYALGIA: Primary | ICD-10-CM

## 2019-07-22 DIAGNOSIS — Z79.899 ENCOUNTER FOR LONG-TERM (CURRENT) DRUG USE: ICD-10-CM

## 2019-07-22 PROCEDURE — 99213 PR OFFICE/OUTPT VISIT, EST, LEVL III, 20-29 MIN: ICD-10-PCS | Mod: ,,, | Performed by: INTERNAL MEDICINE

## 2019-07-22 PROCEDURE — 99213 OFFICE O/P EST LOW 20 MIN: CPT | Mod: ,,, | Performed by: INTERNAL MEDICINE

## 2019-07-22 RX ORDER — DULOXETIN HYDROCHLORIDE 30 MG/1
30 CAPSULE, DELAYED RELEASE ORAL DAILY
Qty: 90 CAPSULE | Refills: 1 | Status: SHIPPED | OUTPATIENT
Start: 2019-07-22 | End: 2021-01-11 | Stop reason: SDUPTHER

## 2019-07-22 RX ORDER — CELECOXIB 200 MG/1
200 CAPSULE ORAL 2 TIMES DAILY
COMMUNITY
End: 2020-06-10 | Stop reason: SDUPTHER

## 2019-07-22 RX ORDER — DICLOFENAC SODIUM 50 MG/1
TABLET, DELAYED RELEASE ORAL
COMMUNITY
End: 2019-10-23

## 2019-07-22 NOTE — LETTER
July 22, 2019      [facility]  [address] [facility] [address]           [facility] - Rheumatology  [address] [address]  Phone: [phone]  Fax: [phone]          Patient: Urszula Dupree   MR Number: [id number]   YOB: 1960   Date of Visit: 7/22/2019       Dear [facility]:    Thank you for referring Urszula Dupree to me for evaluation. Attached you will find relevant portions of my assessment and plan of care.    If you have questions, please do not hesitate to call me. I look forward to following Urszula Dupree along with you.    Sincerely,    Ulices Toledo MD    Enclosure  CC:  No Recipients    If you would like to receive this communication electronically, please contact [email or web address] or [phone] to request more information on [email or web address] Link access.    For providers and/or their staff who would like to refer a patient to [facility], please contact us through our one-stop-shop provider referral line, [facility] , at [phone].    If you feel you have received this communication in error or would no longer like to receive these types of communications, please e-mail [email or web address]

## 2019-07-22 NOTE — PATIENT INSTRUCTIONS
Duloxetine delayed-release capsules  What is this medicine?  DULOXETINE (doo LOX e teen) is used to treat depression, anxiety, and different types of chronic pain.  How should I use this medicine?  Take this medicine by mouth with a glass of water. Follow the directions on the prescription label. Do not cut, crush or chew this medicine. You can take this medicine with or without food. Take your medicine at regular intervals. Do not take your medicine more often than directed. Do not stop taking this medicine suddenly except upon the advice of your doctor. Stopping this medicine too quickly may cause serious side effects or your condition may worsen.  A special MedGuide will be given to you by the pharmacist with each prescription and refill. Be sure to read this information carefully each time.  Talk to your pediatrician regarding the use of this medicine in children. While this drug may be prescribed for children as young as 7 years of age for selected conditions, precautions do apply.  What side effects may I notice from receiving this medicine?  Side effects that you should report to your doctor or health care professional as soon as possible:  · allergic reactions like skin rash, itching or hives, swelling of the face, lips, or tongue  · changes in blood pressure  · confusion  · dark urine  · dizziness  · fast talking and excited feelings or actions that are out of control  · fast, irregular heartbeat  · fever  · general ill feeling or flu-like symptoms  · hallucination, loss of contact with reality  · light-colored stools  · loss of balance or coordination  · redness, blistering, peeling or loosening of the skin, including inside the mouth  · right upper belly pain  · seizures  · suicidal thoughts or other mood changes  · trouble concentrating  · trouble passing urine or change in the amount of urine  · unusual bleeding or bruising  · unusually weak or tired  · yellowing of the eyes or skin  Side effects that  usually do not require medical attention (report to your doctor or health care professional if they continue or are bothersome):  · blurred vision  · change in appetite  · change in sex drive or performance  · headache  · increased sweating  · nausea  What may interact with this medicine?  Do not take this medicine with any of the following medications:  · certain diet drugs like dexfenfluramine, fenfluramine  · desvenlafaxine  · linezolid  · MAOIs like Azilect, Carbex, Eldepryl, Marplan, Nardil, and Parnate  · methylene blue (intravenous)  · milnacipran  · thioridazine  · venlafaxine  This medicine may also interact with the following medications:  · alcohol  · aspirin and aspirin-like medicines  · certain antibiotics like ciprofloxacin and enoxacin  · certain medicines for blood pressure, heart disease, irregular heart beat  · certain medicines for depression, anxiety, or psychotic disturbances  · certain medicines for migraine headache like almotriptan, eletriptan, frovatriptan, naratriptan, rizatriptan, sumatriptan, zolmitriptan  · certain medicines that treat or prevent blood clots like warfarin, enoxaparin, and dalteparin  · cimetidine  · fentanyl  · lithium  · NSAIDS, medicines for pain and inflammation, like ibuprofen or naproxen  · phentermine  · procarbazine  · sibutramine  · Yaneth's wort  · theophylline  · tramadol  · tryptophan  What if I miss a dose?  If you miss a dose, take it as soon as you can. If it is almost time for your next dose, take only that dose. Do not take double or extra doses.  Where should I keep my medicine?  Keep out of the reach of children.  Store at room temperature between 20 and 25 degrees C (68 to 77 degrees F). Throw away any unused medicine after the expiration date.  What should I tell my health care provider before I take this medicine?  They need to know if you have any of these conditions:  · bipolar disorder or a family history of bipolar  disorder  · glaucoma  · kidney disease  · liver disease  · suicidal thoughts or a previous suicide attempt  · taken medicines called MAOIs like Carbex, Eldepryl, Marplan, Nardil, and Parnate within 14 days  · an unusual reaction to duloxetine, other medicines, foods, dyes, or preservatives  · pregnant or trying to get pregnant  · breast-feeding  What should I watch for while using this medicine?  Tell your doctor if your symptoms do not get better or if they get worse. Visit your doctor or health care professional for regular checks on your progress. Because it may take several weeks to see the full effects of this medicine, it is important to continue your treatment as prescribed by your doctor.  Patients and their families should watch out for new or worsening thoughts of suicide or depression. Also watch out for sudden changes in feelings such as feeling anxious, agitated, panicky, irritable, hostile, aggressive, impulsive, severely restless, overly excited and hyperactive, or not being able to sleep. If this happens, especially at the beginning of treatment or after a change in dose, call your health care professional.  You may get drowsy or dizzy. Do not drive, use machinery, or do anything that needs mental alertness until you know how this medicine affects you. Do not stand or sit up quickly, especially if you are an older patient. This reduces the risk of dizzy or fainting spells. Alcohol may interfere with the effect of this medicine. Avoid alcoholic drinks.  This medicine can cause an increase in blood pressure. This medicine can also cause a sudden drop in your blood pressure, which may make you feel faint and increase the chance of a fall. These effects are most common when you first start the medicine or when the dose is increased, or during use of other medicines that can cause a sudden drop in blood pressure. Check with your doctor for instructions on monitoring your blood pressure while taking this  medicine.  Your mouth may get dry. Chewing sugarless gum or sucking hard candy, and drinking plenty of water may help. Contact your doctor if the problem does not go away or is severe.  NOTE:This sheet is a summary. It may not cover all possible information. If you have questions about this medicine, talk to your doctor, pharmacist, or health care provider. Copyright© 2017 [employer]

## 2019-07-22 NOTE — PROGRESS NOTES
[facility] RHEUMATOLOGY           Follow-up visit    Notes dictated via Dragon to EPIC. Please forgive any unintended errors.  Subjective:       Patient ID:   NAME: Urszula Dupree : 1960     58 y.o. female    Referring Doc: [facility]  Other Physicians:    Chief Complaint:  Fibromyalgia      HPI/Interval History:   The patient continues to have muscular pain diffusely. She does not feel Lexapro is helpful. She is seeing pain management monthly.    ROS:   GEN:    no fever, night sweats or weight loss  SKIN:   no rashes, erythema, bruising, or swelling, no Raynauds, no photosensitivity  HEENT: no changes in vision, no mouth ulcers, no sicca symptoms, no scalp tenderness, no jaw claudication.  CV:      no CP, PND, WILLIAMSON, orthopnea, no palpitations  PULM: no SOB, cough, hemoptysis, sputum or pleuritic pain  GI:       no GERD, no dysphagia, no hematemesis, no abdominal pain, nausea, vomiting, constipation, diarrhea, melanotic stools, BRBPR  :      no hematuria, dysuria  NEURO: no paresthesias, headaches, acute visual disturbances  MUSCULOSKELETAL:  Generalized myalgias involving the neck, shoulders, lower back and hips  PSYCH:   No insomnia, no increased anxiety, no increased depression    Past Medical/Surgical History:  Past Medical History:   Diagnosis Date    Arthritis     Fib[place]'d  in [place]    GERD (gastroesophageal reflux disease)     Hypertension     Osteoarthritis of knee, unilateral     Sleep apnea     dx'd  - uses cpap at [facility]     Past Surgical History:   Procedure Laterality Date    APPENDECTOMY      HERNIA REPAIR      Incisional hernia repair    HYSTERECTOMY  2010    Complete in two separate surgeries    INNER EAR SURGERY Bilateral     Perforated ear drum    laproscopic cholecystectom      REPAIR OF MENISCUS OF KNEE Right 2018    TONSILLECTOMY         Allergies:  Review of patient's allergies indicates:   Allergen Reactions    Codeine Nausea  Only     She states it upsets her stomach.        Social/Family History:  Social History     Socioeconomic History    Marital status: Single     Spouse name: Not on file    Number of children: Not on file    Years of education: Not on file    Highest education level: Not on file   Occupational History    Not on file   Social Needs    Financial resource strain: Not on file    Food insecurity:     Worry: Not on file     Inability: Not on file    Transportation needs:     Medical: Not on file     Non-medical: Not on file   Tobacco Use    Smoking status: Never Smoker    Smokeless tobacco: Never Used   Substance and Sexual Activity    Alcohol use: No    Drug use: No    Sexual activity: Yes     Partners: Male   Lifestyle    Physical activity:     Days per week: Not on file     Minutes per session: Not on file    Stress: Not on file   Relationships    Social connections:     Talks on phone: Not on file     Gets together: Not on file     Attends Christian service: Not on file     Active member of club or organization: Not on file     Attends meetings of clubs or organizations: Not on file     Relationship status: Not on file   Other Topics Concern    Not on file   Social History Narrative    Not on file     Family History   Problem Relation Age of Onset    Heart disease Mother     Arthritis Mother     Cancer Father     Arthritis Father     Rheum arthritis Daughter      FAMILY HISTORY: negative for Connective Tissue Disease      Medications:    Current Outpatient Medications:     celecoxib (CELEBREX) 200 MG capsule, Take 200 mg by mouth 2 (two) times daily., Disp: , Rfl:     cyclobenzaprine (FLEXERIL) 10 MG tablet, Take 10 mg by mouth 3 (three) times daily as needed for Muscle spasms., Disp: , Rfl:     metoprolol tartrate (LOPRESSOR) 50 MG tablet, Take 50 mg by mouth., Disp: , Rfl:     chlorthalidone (HYGROTEN) 25 MG Tab, Take 25 mg by mouth., Disp: , Rfl:     diclofenac (VOLTAREN) 50 MG EC  tablet, Take by mouth., Disp: , Rfl:     DULoxetine (CYMBALTA) 30 MG capsule, Take 1 capsule (30 mg total) by mouth once daily., Disp: 90 capsule, Rfl: 1    ondansetron (ZOFRAN-ODT) 4 MG TbDL, , Disp: , Rfl:     oxyCODONE-acetaminophen (PERCOCET)  mg per tablet, Take 1 tablet by mouth., Disp: , Rfl:     traZODone (DESYREL) 100 MG tablet, Take 1 tablet (100 mg total) by mouth every evening., Disp: 90 tablet, Rfl: 1      Objective:     Vitals:  Blood pressure 114/79, weight 111.8 kg (246 lb 8 oz).    Physical Examination:   GEN: wn/wd female in no apparent distress  SKIN: no rashes, no sclerodactyly, no Raynaud's, no periungual erythema, no digital tip ulcerations, no nailbed pitting  HEAD: no alopecia, no scalp tenderness, no temporal artery tenderness or induration.  EYES: no pallor, no icterus, PERRLA  ENT:  no thrush, no mucosal dryness or ulcerations, adequate oral hygiene & dentition.  NECK: supple x 6, no masses, no thyromegaly, no lymphadenopathy.  CV:   S1 and S2 regular, no murmurs, gallop or rubs  CHEST: Normal respiratory effort;  normal breath sounds/no adventitious sounds. No signs of consolidation.  ABD: non-tender and non-distended; soft; normal bowel sounds; no rebound/guarding or tenderness. No hepatosplenomegaly.  Musculoskeletal:  no evidence of inflammatory or degenerative progression  EXTREM: no clubbing, cyanosis or edema. normal pulses.  NEURO:  grossly intact; motor/sensory WNL; no tremors  PSYCH:  normal mood, affect and behavior    Labs:   Lab Results   Component Value Date    WBC 4.9 (L) 06/12/2018    HGB 13.1 06/12/2018    HCT 39.4 06/12/2018    MCV 94.9 06/12/2018     06/12/2018   CMP@  Sodium   Date Value Ref Range Status   06/12/2018 139 134 - 144 mmol/L      Potassium   Date Value Ref Range Status   06/12/2018 4.3 3.5 - 5.0 mmol/L      Chloride   Date Value Ref Range Status   06/12/2018 106 98 - 110 mmol/L      CO2   Date Value Ref Range Status   06/12/2018 26.8 22.8 -  31.6 mmol/L      Glucose   Date Value Ref Range Status   06/12/2018 99 70 - 99 mg/dL      BUN, Bld   Date Value Ref Range Status   06/12/2018 11 8 - 20 mg/dL      Creatinine   Date Value Ref Range Status   06/12/2018 0.72 0.60 - 1.40 mg/dL      Calcium   Date Value Ref Range Status   06/12/2018 9.5 7.7 - 10.4 mg/dL      Total Protein   Date Value Ref Range Status   06/12/2018 7.3 6.0 - 8.2 g/dL      Albumin   Date Value Ref Range Status   06/12/2018 4.2 3.1 - 4.7 g/dL      Total Bilirubin   Date Value Ref Range Status   06/12/2018 0.7 0.3 - 1.0 mg/dL      Alkaline Phosphatase   Date Value Ref Range Status   06/12/2018 46 40 - 104 IU/L      AST   Date Value Ref Range Status   06/12/2018 34 10 - 40 IU/L      CPK   Date Value Ref Range Status   03/06/2018 230 (H) 13 - 135 IU/L      CRP   Date Value Ref Range Status   06/12/2018 0.28 0.00 - 1.40 mg/dL      Rheumatoid Factor   Date Value Ref Range Status   06/12/2018 <10.0 0.0 - 13.9 IU/mL      Comment:     Performed at: [facility], [facility] [address], [id number] Pace MD, Phone:  [phone]       Radiology/Diagnostic Studies:    None    Assessment/Discussion/Plan:   58 y.o. female with fibromyalgia- ongoing pain not fully addressed with current medications including regular narcotics from pain management    PLAN:  I will change her Lexapro prescription to Cymbalta 30 mg. I have explained the difference between SNRI and SSRI drugs. She is to notify me if she has any increase in anxiety with this change.    RTC:  I will see her back in 3 months    Electronically signed by Ulices Toledo MD

## 2019-10-23 ENCOUNTER — OFFICE VISIT (OUTPATIENT)
Dept: RHEUMATOLOGY | Facility: CLINIC | Age: 59
End: 2019-10-23
Payer: COMMERCIAL

## 2019-10-23 VITALS
BODY MASS INDEX: 38.48 KG/M2 | WEIGHT: 238.38 LBS | DIASTOLIC BLOOD PRESSURE: 70 MMHG | SYSTOLIC BLOOD PRESSURE: 104 MMHG

## 2019-10-23 DIAGNOSIS — Z79.899 ENCOUNTER FOR LONG-TERM (CURRENT) DRUG USE: ICD-10-CM

## 2019-10-23 DIAGNOSIS — M79.7 FIBROMYALGIA: Primary | ICD-10-CM

## 2019-10-23 PROCEDURE — 99213 PR OFFICE/OUTPT VISIT, EST, LEVL III, 20-29 MIN: ICD-10-PCS | Mod: S$GLB,,, | Performed by: INTERNAL MEDICINE

## 2019-10-23 PROCEDURE — 99213 OFFICE O/P EST LOW 20 MIN: CPT | Mod: S$GLB,,, | Performed by: INTERNAL MEDICINE

## 2019-10-23 RX ORDER — PHENTERMINE HYDROCHLORIDE 15 MG/1
15 CAPSULE ORAL EVERY MORNING
COMMUNITY
End: 2020-02-13 | Stop reason: DRUGHIGH

## 2019-10-23 RX ORDER — DICLOFENAC SODIUM 10 MG/G
2 GEL TOPICAL 4 TIMES DAILY
COMMUNITY

## 2019-10-23 RX ORDER — LIDOCAINE 50 MG/G
1 PATCH TOPICAL
COMMUNITY

## 2019-10-23 NOTE — PROGRESS NOTES
[facility] RHEUMATOLOGY           Follow-up visit    Notes dictated to M*Modal. Please forgive any unintended errors.  Subjective:       Patient ID:   NAME: Urszula Dupree : 1960     59 y.o. female    Referring Doc: [facility]  Other Physicians:    Chief Complaint:  Fibromyalgia      HPI/Interval History:  The patient is doing very well.  She is extremely pleased with the change to Cymbalta.  She has almost no pain.    ROS:   GEN:    no fever, night sweats or weight loss  SKIN:   no rashes, erythema, bruising, or swelling, no Raynauds, no photosensitivity  HEENT: no changes in vision, no mouth ulcers, no sicca symptoms, no scalp tenderness, no jaw claudication.  CV:      no CP, PND, WILLIAMSON, orthopnea, no palpitations  PULM: no SOB, cough, hemoptysis, sputum or pleuritic pain  GI:       no GERD, no dysphagia, no hematemesis, no abdominal pain, nausea, vomiting, constipation, diarrhea, melanotic stools, BRBPR  :      no hematuria, dysuria  NEURO: no paresthesias, headaches, acute visual disturbances  MUSCULOSKELETAL:  No muscle or joint complaints  PSYCH:   No increased insomnia, no increased anxiety, no increased depression    Past Medical/Surgical History:  Past Medical History:   Diagnosis Date    Arthritis     Fib[place]     [place]  in [place], [place]    GERD (gastroesophageal reflux disease)     Hypertension     Osteoarthritis of knee, unilateral     Sleep apnea     dx'd  - uses cpap at [facility]     Past Surgical History:   Procedure Laterality Date    APPENDECTOMY      HERNIA REPAIR      Incisional hernia repair    HYSTERECTOMY  2010    Complete in two separate surgeries    INNER EAR SURGERY Bilateral     Perforated ear drum    laproscopic cholecystectom      REPAIR OF MENISCUS OF KNEE Right 2018    TONSILLECTOMY         Allergies:  Review of patient's allergies indicates:   Allergen Reactions    Codeine Nausea Only     She states it upsets her stomach.         Social/Family History:  Social History     Socioeconomic History    Marital status: Single     Spouse name: Not on file    Number of children: Not on file    Years of education: Not on file    Highest education level: Not on file   Occupational History    Not on file   Social Needs    Financial resource strain: Not on file    Food insecurity:     Worry: Not on file     Inability: Not on file    Transportation needs:     Medical: Not on file     Non-medical: Not on file   Tobacco Use    Smoking status: Never Smoker    Smokeless tobacco: Never Used   Substance and Sexual Activity    Alcohol use: No    Drug use: No    Sexual activity: Yes     Partners: Male   Lifestyle    Physical activity:     Days per week: Not on file     Minutes per session: Not on file    Stress: Not on file   Relationships    Social connections:     Talks on phone: Not on file     Gets together: Not on file     Attends [employer] service: Not on file     Active member of club or organization: Not on file     Attends meetings of clubs or organizations: Not on file     Relationship status: Not on file   Other Topics Concern    Not on file   Social History Narrative    Not on file     Family History   Problem Relation Age of Onset    Heart disease Mother     Arthritis Mother     Cancer Father     Arthritis Father     Rheum arthritis Daughter      FAMILY HISTORY: negative for Connective Tissue Disease      Medications:    Current Outpatient Medications:     celecoxib (CELEBREX) 200 MG capsule, Take 200 mg by mouth 2 (two) times daily., Disp: , Rfl:     chlorthalidone (HYGROTEN) 25 MG Tab, Take 25 mg by mouth., Disp: , Rfl:     cyclobenzaprine (FLEXERIL) 10 MG tablet, Take 10 mg by mouth 3 (three) times daily as needed for Muscle spasms., Disp: , Rfl:     diclofenac sodium (VOLTAREN) 1 % Gel, Apply 2 g topically 4 (four) times daily., Disp: , Rfl:     DULoxetine (CYMBALTA) 30 MG capsule, Take 1 capsule (30 mg total) by  mouth once daily., Disp: 90 capsule, Rfl: 1    ergocalciferol, vitamin D2, (VITAMIN D ORAL), Take by mouth once a week., Disp: , Rfl:     lidocaine (LIDODERM) 5 %, Place 1 patch onto the skin every 24 hours. Remove & Discard patch within 12 hours or as directed by MD, Disp: , Rfl:     metoprolol tartrate (LOPRESSOR) 50 MG tablet, Take 50 mg by mouth., Disp: , Rfl:     phentermine 15 MG capsule, Take 15 mg by mouth every morning., Disp: , Rfl:       Objective:     Vitals:  Blood pressure 104/70, weight 108.1 kg (238 lb 6.4 oz).    Physical Examination:   GEN: wn/wd female in no apparent distress  SKIN: no rashes, no sclerodactyly, no Raynaud's, no periungual erythema, no digital tip ulcerations, no nailbed pitting  HEAD: no alopecia, no scalp tenderness, no temporal artery tenderness or induration.  EYES: no pallor, no icterus, PERRLA  ENT:  no thrush, no mucosal dryness or ulcerations, adequate oral hygiene & dentition.  NECK: supple x 6, no masses, no thyromegaly, no lymphadenopathy.  CV:   S1 and S2 regular, no murmurs, gallop or rubs  CHEST: Normal respiratory effort;  normal breath sounds/no adventitious sounds. No signs of consolidation.  ABD: non-tender and non-distended; soft; normal bowel sounds; no rebound/guarding or tenderness. No hepatosplenomegaly.  Musculoskeletal:  No evidence of inflammatory arthritis.  No progressive deformity  EXTREM: no clubbing, cyanosis or edema. normal pulses.  NEURO:  grossly intact; motor/sensory WNL; no tremors  PSYCH:  normal mood, affect and behavior    Labs:   Lab Results   Component Value Date    WBC 4.9 (L) 06/12/2018    HGB 13.1 06/12/2018    HCT 39.4 06/12/2018    MCV 94.9 06/12/2018     06/12/2018   CMP@  Sodium   Date Value Ref Range Status   06/12/2018 139 134 - 144 mmol/L      Potassium   Date Value Ref Range Status   06/12/2018 4.3 3.5 - 5.0 mmol/L      Chloride   Date Value Ref Range Status   06/12/2018 106 98 - 110 mmol/L      CO2   Date Value Ref  Range Status   06/12/2018 26.8 22.8 - 31.6 mmol/L      Glucose   Date Value Ref Range Status   06/12/2018 99 70 - 99 mg/dL      BUN, Bld   Date Value Ref Range Status   06/12/2018 11 8 - 20 mg/dL      Creatinine   Date Value Ref Range Status   06/12/2018 0.72 0.60 - 1.40 mg/dL      Calcium   Date Value Ref Range Status   06/12/2018 9.5 7.7 - 10.4 mg/dL      Total Protein   Date Value Ref Range Status   06/12/2018 7.3 6.0 - 8.2 g/dL      Albumin   Date Value Ref Range Status   06/12/2018 4.2 3.1 - 4.7 g/dL      Total Bilirubin   Date Value Ref Range Status   06/12/2018 0.7 0.3 - 1.0 mg/dL      Alkaline Phosphatase   Date Value Ref Range Status   06/12/2018 46 40 - 104 IU/L      AST   Date Value Ref Range Status   06/12/2018 34 10 - 40 IU/L      CPK   Date Value Ref Range Status   03/06/2018 230 (H) 13 - 135 IU/L      CRP   Date Value Ref Range Status   06/12/2018 0.28 0.00 - 1.40 mg/dL      Rheumatoid Factor   Date Value Ref Range Status   06/12/2018 <10.0 0.0 - 13.9 IU/mL      Comment:     Performed at: [facility], [facility] [address], [id number] Pace MD, Phone:  [phone]       Radiology/Diagnostic Studies:    None    Assessment/Discussion/Plan:   59 y.o. female with fibromyalgia-good control on Cymbalta 30 mg daily and Flexeril 10 mg nightly    PLAN:  I will continue her medication unchanged.  No blood testing was required today.    RTC:  I will see her back in 4-6 months.    Electronically signed by Ulices Toledo MD

## 2019-10-23 NOTE — LETTER
October 23, 2019      [facility]  [address] [facility] [address]           [facility] - Rheumatology  [address] [address]  Phone: [phone]  Fax: [phone]          Patient: Urszula Dupree   MR Number: [id number]   YOB: 1960   Date of Visit: 10/23/2019       Dear [facility]:    Thank you for referring Urszula Dupree to me for evaluation. Attached you will find relevant portions of my assessment and plan of care.    If you have questions, please do not hesitate to call me. I look forward to following Urszula Dupree along with you.    Sincerely,    Ulices Toledo MD    Enclosure  CC:  No Recipients    If you would like to receive this communication electronically, please contact [email or web address] or [phone] to request more information on [employer] access.    For providers and/or their staff who would like to refer a patient to [facility], please contact us through our one-stop-shop provider referral line, [facility] , at [phone].    If you feel you have received this communication in error or would no longer like to receive these types of communications, please e-mail [email or web address]

## 2020-02-13 ENCOUNTER — OFFICE VISIT (OUTPATIENT)
Dept: RHEUMATOLOGY | Facility: CLINIC | Age: 60
End: 2020-02-13
Payer: COMMERCIAL

## 2020-02-13 VITALS
SYSTOLIC BLOOD PRESSURE: 123 MMHG | DIASTOLIC BLOOD PRESSURE: 87 MMHG | BODY MASS INDEX: 37.96 KG/M2 | TEMPERATURE: 97 F | WEIGHT: 235.19 LBS

## 2020-02-13 DIAGNOSIS — M79.7 FIBROMYALGIA: Primary | ICD-10-CM

## 2020-02-13 DIAGNOSIS — M77.8 TENDINITIS OF RIGHT TRICEPS: ICD-10-CM

## 2020-02-13 DIAGNOSIS — M19.90 OSTEOARTHRITIS, UNSPECIFIED OSTEOARTHRITIS TYPE, UNSPECIFIED SITE: ICD-10-CM

## 2020-02-13 DIAGNOSIS — M54.2 CERVICALGIA: ICD-10-CM

## 2020-02-13 PROCEDURE — 99214 OFFICE O/P EST MOD 30 MIN: CPT | Mod: S$GLB,,, | Performed by: INTERNAL MEDICINE

## 2020-02-13 PROCEDURE — 99214 PR OFFICE/OUTPT VISIT, EST, LEVL IV, 30-39 MIN: ICD-10-PCS | Mod: S$GLB,,, | Performed by: INTERNAL MEDICINE

## 2020-02-13 RX ORDER — PANTOPRAZOLE SODIUM 20 MG/1
20 TABLET, DELAYED RELEASE ORAL DAILY
COMMUNITY

## 2020-02-13 RX ORDER — ASPIRIN 325 MG
TABLET, DELAYED RELEASE (ENTERIC COATED) ORAL
COMMUNITY
Start: 2020-01-31

## 2020-02-13 RX ORDER — METOPROLOL SUCCINATE 50 MG/1
TABLET, EXTENDED RELEASE ORAL
COMMUNITY
Start: 2019-12-03

## 2020-02-13 RX ORDER — HYDROCHLOROTHIAZIDE 12.5 MG/1
TABLET ORAL
COMMUNITY
Start: 2020-01-31

## 2020-02-13 RX ORDER — PRAVASTATIN SODIUM 40 MG/1
TABLET ORAL
COMMUNITY
Start: 2020-01-30

## 2020-02-13 RX ORDER — PHENTERMINE HYDROCHLORIDE 37.5 MG/1
TABLET ORAL
COMMUNITY
Start: 2020-01-23

## 2020-02-13 NOTE — LETTER
February 13, 2020      [facility]  [address] [place] [address]           [facility] - Rheumatology  [address] [facility]  [address] [place] [address]  Phone: [phone]  Fax: [phone]          Patient: Urszula Dupree   MR Number: [id number]   YOB: 1960   Date of Visit: 2/13/2020       Dear [facility]:    Thank you for referring Urszula Dupree to me for evaluation. Attached you will find relevant portions of my assessment and plan of care.    If you have questions, please do not hesitate to call me. I look forward to following Urszula Dupree along with you.    Sincerely,    Ulices Toledo MD    Enclosure  CC:  No Recipients    If you would like to receive this communication electronically, please contact [email or web address] or [phone] to request more information on [employer] Link access.    For providers and/or their staff who would like to refer a patient to [facility], please contact us through our one-stop-shop provider referral line, [facility], at [phone].    If you feel you have received this communication in error or would no longer like to receive these types of communications, please e-mail [email or web address]

## 2020-02-13 NOTE — PATIENT INSTRUCTIONS
Pinched Nerve in the Neck  A pinched nerve in the neck (cervical radiculopathy) is caused when the nerve that goes from the spinal cord to the neck or arm is irritated or has pressure on it. This may be caused by a bulging spinal disk. A spinal disk is the cushion between each spinal bone. Or it may be caused by a narrowing of the spinal joint because of osteoarthritis and wear and tear from repeated injuries.  A pinched nerve can cause numbness, tingling, deep aching, or electrical shooting pain from the side of the neck all the way down to the fingers on one side.  A pinched nerve may start after a sudden turning or bending force (such as in a car accident) or after a simple awkward movement. In either case, muscle spasm is commonly present and adds to the pain.  Home care  Follow these guidelines when caring for yourself at home:  · Rest and relax the muscles. Use a comfortable pillow that supports your head and keeps your spine in a natural (neutral) position. Your head shouldnt be tilted forward or backward. A rolled-up towel may help for a custom fit. When standing or sitting, keep your neck in line with your body. Keep your head up and shoulders down. Stay away from activities that require you to move your neck a lot.  · You can use heat and massage to help ease the pain. Take a hot shower or bath, or use a heating pad. You can also use a cold pack for relief. You can make a cold pack by wrapping a plastic bag of crushed or cubed ice in a thin towel. Try both heat and cold, and use the method that feels best. Do this for 20 minutes several times a day.  · You may use acetaminophen or ibuprofen to control pain, unless another pain medicine was prescribed. If you have chronic liver or kidney disease, talk with your healthcare provider before using these medicines. Also talk with your provider if youve had a stomach ulcer or gastrointestinal bleeding.  · Reduce stress. Stress can make it longer for your pain  to go away.  · Do any exercises or stretches that were given to you as part of your discharge plan.  · Wear a soft collar, if prescribed.  · Physical therapy and massages are known to help.  · You may need surgery for a more serious injury.  Follow-up care  Follow up with your healthcare provider, or as advised, if you dont start to get better after 1 week. You may need more tests. Tell your provider about any fever, chills, or weight loss.  If X-rays were taken, a radiologist may look at them. You will be told of any new findings that may affect your care.  When to seek medical advice  Call your healthcare provider right away if any of these occur:  · Pain becomes worse even after taking prescribed pain medicine  · Weakness in the arm or legs  · Numbness in the arm gets worse  · Trouble breathing or swallowing  Date Last Reviewed: 5/1/2017  © 1799-0683 [employer]. [address] [place] [address]  Having EMG and NCS Tests  You will be having electromyography (EMG) and nerve conduction studies (NCS) to measure muscle and nerve function. In most cases, both tests are done. NCS is most often done first. You will be asked to lie on an exam table with a blanket over you. You may have one or both of the following:    Nerve conduction study (NCS)  During NCS, mild electrical currents are used to test how fast impulses move along your nerves. The healthcare provider will put small metal disks (electrodes) on your skin on the area of your body being tested. This will be done using water-based gel or paste. A doctor or technologist will apply mild electrical currents to your skin. Your muscles will twitch, but the test wont harm you. Currents are usually applied to the same area several times. Usually the intensity of the electrical stimulation is increased on each body part. Despite some increasing discomfort that varies from person to person, the electrical shock is not dangerous. The test may continue on other parts of your  body unless the reason for doing the test is limited to a small part of the body.  Electromyography (EMG)  Most of the electrodes will be removed for EMG. The doctor will clean the area being tested with alcohol. A very fine needle will be put into the muscles in this region. When the needle is inserted, you may feel as if your skin is being pinched. Try to relax and do as instructed, since you will be asked to relax and contract the muscle being tested. Following instructions will allow your doctor to interpret the test results.  Let the technologist know  For your safety and for the success of your test, tell the technologist if you:  · Have any bleeding problems.  · Take blood thinners (anticoagulants) or other medications, including aspirin.  · Have any immune system problems.  · Have had neck or back surgery.  You may also be asked questions about your overall health.  Before the test  Prepare for your test as instructed. Shower or bathe, but don't use powder, oil, or lotion. Your skin should be clean and free of excess oil. Wear loose clothes. But know that you may be asked to change into a hospital gown. The entire test will take about 60 minutes. Be sure to allow extra time to check in.  After your test  Before you leave, all electrodes will be removed. You can then get right back to your normal routine. If you feel tired or have some discomfort, take it easy. If you were told to stop taking any medicines for your test, ask when you can start taking them again. Your doctor will let you know when your test results are ready.  Date Last Reviewed: 9/12/2015 © 2000-2017 [employer]. [address] [place] [address]. All rights reserved. This information is not intended as a substitute for professional medical care. Always follow your healthcare professional's instructions[place] [address]. All rights reserved. This information is not intended as a substitute for  professional medical care. Always follow your healthcare professional's instructions.

## 2020-02-13 NOTE — PROGRESS NOTES
[facility] RHEUMATOLOGY           Follow-up visit    Notes dictated to M*Modal. Please forgive any unintended errors.  Subjective:       Patient ID:   NAME: Urszula Dupree : 1960     59 y.o. female    Referring Doc: Sarkis Levy [facility]  Other Physicians:    Chief Complaint:  Fibromyalgia      HPI/Interval History:  The patient is generally doing well though she has had some pain in the right shoulder and in the posterior aspect of the right arm.  She describes this as aching and occasionally tingling with numbness in her fingers only at night.  Her current medications including Voltaren gel have been of no help.  She has not experienced any recent trauma nor any distant significant neck or shoulder injury.  She has not been performing any unusual repetitive motion tasks.    ROS:   GEN:    no fever, night sweats or weight loss  SKIN:   no rashes, erythema, bruising, or swelling, no Raynauds, no photosensitivity  HEENT: no changes in vision, no mouth ulcers, no sicca symptoms, no scalp tenderness, no jaw claudication.  CV:      no CP, PND, WILLIAMSON, orthopnea, no palpitations  PULM: no SOB, cough, hemoptysis, sputum or pleuritic pain  GI:       no GERD, no dysphagia, no hematemesis, no abdominal pain, nausea, vomiting, constipation, diarrhea, melanotic stools, BRBPR  :      no hematuria, dysuria  NEURO: + paresthesias, headaches, acute visual disturbances  MUSCULOSKELETAL:  No red, hot, and/or swollen joints    PSYCH:   No increased insomnia, no increased anxiety, no increased depression    Past Medical/Surgical History:  Past Medical History:   Diagnosis Date    Arthritis     Fibromyalgia     dx'd  in [place]    GERD (gastroesophageal reflux disease)     Hypertension     Osteoarthritis of knee, unilateral     Sleep apnea     dx'd  - [facility] cpap at [facility]     Past Surgical History:   Procedure Laterality Date    APPENDECTOMY      HERNIA REPAIR      Incisional hernia repair    HYSTERECTOMY       Complete in two separate surgeries    INNER EAR SURGERY Bilateral 2003    Perforated ear drum    laproscopic cholecystectom      REPAIR OF MENISCUS OF KNEE Right 02/2018    TONSILLECTOMY         Allergies:  Review of patient's allergies indicates:   Allergen Reactions    Codeine Nausea Only     She states it upsets her stomach.        Social/Family History:  Social History     Socioeconomic History    Marital status: Single     Spouse name: Not on file    Number of children: Not on file    Years of education: Not on file    Highest education level: Not on file   Occupational History    Not on file   Social Needs    Financial resource strain: Not on file    Food insecurity:     Worry: Not on file     Inability: Not on file    Transportation needs:     Medical: Not on file     Non-medical: Not on file   Tobacco Use    Smoking status: Never Smoker    Smokeless tobacco: Never Used   Substance and Sexual Activity    Alcohol use: No    Drug use: No    Sexual activity: Yes     Partners: Male   Lifestyle    Physical activity:     Days per week: Not on file     Minutes per session: Not on file    Stress: Not on file   Relationships    Social connections:     Talks on phone: Not on file     Gets together: Not on file     Attends Shinto service: Not on file     Active member of club or organization: Not on file     Attends meetings of clubs or organizations: Not on file     Relationship status: Not on file   Other Topics Concern    Not on file   Social History Narrative    Not on file     Family History   Problem Relation Age of Onset    Heart disease Mother     Arthritis Mother     Cancer Father     Arthritis Father     Rheum arthritis Daughter      FAMILY HISTORY: negative for Connective Tissue Disease      Medications:    Current Outpatient Medications:     celecoxib (CELEBREX) 200 MG capsule, Take 200 mg by mouth 2 (two) times daily., Disp: , Rfl:     cholecalciferol, vitamin D3, 1,250 mcg  (50,000 unit) capsule, , Disp: , Rfl:     cyclobenzaprine (FLEXERIL) 10 MG tablet, Take 10 mg by mouth 3 (three) times daily as needed for Muscle spasms., Disp: , Rfl:     diclofenac sodium (VOLTAREN) 1 % Gel, Apply 2 g topically 4 (four) times daily., Disp: , Rfl:     DULoxetine (CYMBALTA) 30 MG capsule, Take 1 capsule (30 mg total) by mouth once daily., Disp: 90 capsule, Rfl: 1    hydroCHLOROthiazide (HYDRODIURIL) 12.5 MG Tab, , Disp: , Rfl:     lidocaine (LIDODERM) 5 %, Place 1 patch onto the skin every 24 hours. Remove & Discard patch within 12 hours or as directed by MD, Disp: , Rfl:     pantoprazole (PROTONIX) 20 MG tablet, Take 20 mg by mouth once daily., Disp: , Rfl:     phentermine (ADIPEX-P) 37.5 mg tablet, , Disp: , Rfl:     pravastatin (PRAVACHOL) 40 MG tablet, , Disp: , Rfl:     TOPROL XL 50 mg 24 hr tablet, , Disp: , Rfl:       Objective:     Vitals:  Blood pressure 123/87, temperature 96.5 °F (35.8 °C), weight 106.7 kg (235 lb 3.2 oz).    Physical Examination:   GEN: wn/wd female in no apparent distress  SKIN: no rashes, no sclerodactyly, no Raynaud's, no periungual erythema, no digital tip ulcerations, no nailbed pitting  HEAD: no alopecia, no scalp tenderness, no temporal artery tenderness or induration.  EYES: no pallor, no icterus, PERRLA  ENT:  no thrush, no mucosal dryness or ulcerations, adequate oral hygiene & dentition.  NECK: supple x 6, no masses, no thyromegaly, no lymphadenopathy.  CV:   S1 and S2 regular, no murmurs, gallop or rubs  CHEST: Normal respiratory effort;  normal breath sounds/no adventitious sounds. No signs of consolidation.  ABD: non-tender and non-distended; soft; normal bowel sounds; no rebound/guarding or tenderness. No hepatosplenomegaly.  Musculoskeletal:  Moderate tenderness overlying the triceps tendon on the right.  Yergason's sign is negative.  Range of motion of the shoulder and elbow are normal.  Phalen's and Tinel signs are negative bilaterally.     EXTREM: no clubbing, cyanosis or edema. normal pulses.  NEURO:  grossly intact; motor WNL; no tremors  PSYCH:  normal mood, affect and behavior    Labs:   Lab Results   Component Value Date    WBC 4.9 (L) 06/12/2018    HGB 13.1 06/12/2018    HCT 39.4 06/12/2018    MCV 94.9 06/12/2018     06/12/2018   CMP@  Sodium   Date Value Ref Range Status   06/12/2018 139 134 - 144 mmol/L      Potassium   Date Value Ref Range Status   06/12/2018 4.3 3.5 - 5.0 mmol/L      Chloride   Date Value Ref Range Status   06/12/2018 106 98 - 110 mmol/L      CO2   Date Value Ref Range Status   06/12/2018 26.8 22.8 - 31.6 mmol/L      Glucose   Date Value Ref Range Status   06/12/2018 99 70 - 99 mg/dL      BUN, Bld   Date Value Ref Range Status   06/12/2018 11 8 - 20 mg/dL      Creatinine   Date Value Ref Range Status   06/12/2018 0.72 0.60 - 1.40 mg/dL      Calcium   Date Value Ref Range Status   06/12/2018 9.5 7.7 - 10.4 mg/dL      Total Protein   Date Value Ref Range Status   06/12/2018 7.3 6.0 - 8.2 g/dL      Albumin   Date Value Ref Range Status   06/12/2018 4.2 3.1 - 4.7 g/dL      Total Bilirubin   Date Value Ref Range Status   06/12/2018 0.7 0.3 - 1.0 mg/dL      Alkaline Phosphatase   Date Value Ref Range Status   06/12/2018 46 40 - 104 IU/L      AST   Date Value Ref Range Status   06/12/2018 34 10 - 40 IU/L      CPK   Date Value Ref Range Status   03/06/2018 230 (H) 13 - 135 IU/L      CRP   Date Value Ref Range Status   06/12/2018 0.28 0.00 - 1.40 mg/dL      Rheumatoid Factor   Date Value Ref Range Status   06/12/2018 <10.0 0.0 - 13.9 IU/mL      Comment:     Performed at: [facility], [facility] [address], [id number] Pace MD, Phone:  [phone]       Radiology/Diagnostic Studies:    None    Assessment/Discussion/Plan:   59 y.o. female with fibromyalgia-stable on Cymbalta 30 mg daily and Flexeril 10 mg nightly  2) right upper extremity pain suggestive of C3/4 cervical radiculopathy versus  triceps tendinitis    PLAN:  Have asked her to use moist heat on the neck and shoulder several times daily.  She is to increase her Flexeril to 3 times daily (it does not sedate her).    She had an MRI of the neck 2 years ago; this is not in this Epic system but she will obtain a copy for me and send it by fax as soon as possible.    If this has not resolved in 3 weeks, she will notify me and I will consider electrodiagnostic studies.    RTC:  I will see her back in 3-4 months or sooner if needed    Electronically signed by Ulices Toledo MD

## 2020-06-10 ENCOUNTER — OFFICE VISIT (OUTPATIENT)
Dept: RHEUMATOLOGY | Facility: CLINIC | Age: 60
End: 2020-06-10
Payer: COMMERCIAL

## 2020-06-10 VITALS
SYSTOLIC BLOOD PRESSURE: 117 MMHG | TEMPERATURE: 98 F | BODY MASS INDEX: 36.2 KG/M2 | DIASTOLIC BLOOD PRESSURE: 81 MMHG | WEIGHT: 224.31 LBS

## 2020-06-10 DIAGNOSIS — M19.90 OSTEOARTHRITIS, UNSPECIFIED OSTEOARTHRITIS TYPE, UNSPECIFIED SITE: Primary | ICD-10-CM

## 2020-06-10 DIAGNOSIS — M79.7 FIBROMYALGIA: ICD-10-CM

## 2020-06-10 DIAGNOSIS — Z79.899 ENCOUNTER FOR LONG-TERM (CURRENT) DRUG USE: ICD-10-CM

## 2020-06-10 PROCEDURE — 99213 OFFICE O/P EST LOW 20 MIN: CPT | Mod: S$GLB,,, | Performed by: INTERNAL MEDICINE

## 2020-06-10 PROCEDURE — 99213 PR OFFICE/OUTPT VISIT, EST, LEVL III, 20-29 MIN: ICD-10-PCS | Mod: S$GLB,,, | Performed by: INTERNAL MEDICINE

## 2020-06-10 RX ORDER — TIZANIDINE HYDROCHLORIDE 4 MG/1
CAPSULE, GELATIN COATED ORAL
COMMUNITY
Start: 2020-05-06

## 2020-06-10 RX ORDER — CELECOXIB 200 MG/1
200 CAPSULE ORAL 2 TIMES DAILY
Qty: 90 CAPSULE | Refills: 3 | Status: SHIPPED | OUTPATIENT
Start: 2020-06-10 | End: 2021-04-20

## 2020-06-10 NOTE — PROGRESS NOTES
[facility] RHEUMATOLOGY           Follow-up visit    Notes dictated to M*Modal. Please forgive any unintended errors.  Subjective:       Patient ID:   NAME: Urszula Dupree : 1960     59 y.o. female    Referring Doc: Sarkis Levy [facility]  Other Physicians:    Chief Complaint:  Fibromyalgia      HPI/Interval History:  The patient is doing well.  Her moods have been stable.  She has had limited pain.  She has been sleeping well.    ROS:   GEN:    no fever, night sweats or weight loss  SKIN:   no rashes, erythema, bruising, or swelling, no Raynauds, no photosensitivity  HEENT: no changes in vision, no mouth ulcers, no sicca symptoms, no scalp tenderness, no jaw claudication.  CV:      no CP, PND, WILLIAMSON, orthopnea, no palpitations  PULM: no SOB, cough, hemoptysis, sputum or pleuritic pain  GI:       no GERD, no dysphagia, no hematemesis, no abdominal pain, nausea, vomiting, constipation, diarrhea, melanotic stools, BRBPR  :      no hematuria, dysuria  NEURO: no paresthesias, headaches, acute visual disturbances  MUSCULOSKELETAL:  As above  PSYCH:   No increased insomnia, no increased anxiety, no increased depression    Past Medical/Surgical History:  Past Medical History:   Diagnosis Date    Arthritis     Fibromyalgia     dx'd  in [place], [place]    GERD (gastroesophageal reflux disease)     Hypertension     Osteoarthritis of knee, unilateral     Sleep apnea     dx'd  - uses cpap at [facility]     Past Surgical History:   Procedure Laterality Date    APPENDECTOMY      HERNIA REPAIR      Incisional hernia repair    HYSTERECTOMY  2010    Complete in two separate surgeries    INNER EAR SURGERY Bilateral     Perforated ear drum    laproscopic cholecystectom      REPAIR OF MENISCUS OF KNEE Right 2018    TONSILLECTOMY         Allergies:  Review of patient's allergies indicates:   Allergen Reactions    Codeine Nausea Only     She states it upsets her stomach.        Social/Family History:  Social  History     Socioeconomic History    Marital status: Single     Spouse name: Not on file    Number of children: Not on file    Years of education: Not on file    Highest education level: Not on file   Occupational History    Not on file   Social Needs    Financial resource strain: Not on file    Food insecurity:     Worry: Not on file     Inability: Not on file    Transportation needs:     Medical: Not on file     Non-medical: Not on file   Tobacco Use    Smoking status: Never Smoker    Smokeless tobacco: Never Used   Substance and Sexual Activity    Alcohol use: No    Drug use: No    Sexual activity: Yes     Partners: Male   Lifestyle    Physical activity:     Days per week: Not on file     Minutes per session: Not on file    Stress: Not on file   Relationships    Social connections:     Talks on phone: Not on file     Gets together: Not on file     Attends [employer] service: Not on file     Active member of club or organization: Not on file     Attends meetings of clubs or organizations: Not on file     Relationship status: Not on file   Other Topics Concern    Not on file   Social History Narrative    Not on file     Family History   Problem Relation Age of Onset    Heart disease Mother     Arthritis Mother     Cancer Father     Arthritis Father     Rheum arthritis Daughter      FAMILY HISTORY: negative for Connective Tissue Disease      Medications:    Current Outpatient Medications:     celecoxib (CELEBREX) 200 MG capsule, Take 1 capsule (200 mg total) by mouth 2 (two) times daily., Disp: 90 capsule, Rfl: 3    cholecalciferol, vitamin D3, 1,250 mcg (50,000 unit) capsule, , Disp: , Rfl:     cyclobenzaprine (FLEXERIL) 10 MG tablet, Take 10 mg by mouth 3 (three) times daily as needed for Muscle spasms., Disp: , Rfl:     diclofenac sodium (VOLTAREN) 1 % Gel, Apply 2 g topically 4 (four) times daily., Disp: , Rfl:     DULoxetine (CYMBALTA) 30 MG capsule, Take 1 capsule (30 mg total) by  mouth once daily., Disp: 90 capsule, Rfl: 1    hydroCHLOROthiazide (HYDRODIURIL) 12.5 MG Tab, , Disp: , Rfl:     lidocaine (LIDODERM) 5 %, Place 1 patch onto the skin every 24 hours. Remove & Discard patch within 12 hours or as directed by MD, Disp: , Rfl:     pantoprazole (PROTONIX) 20 MG tablet, Take 20 mg by mouth once daily., Disp: , Rfl:     phentermine (ADIPEX-P) 37.5 mg tablet, , Disp: , Rfl:     pravastatin (PRAVACHOL) 40 MG tablet, , Disp: , Rfl:     tiZANidine 4 mg Cap, , Disp: , Rfl:     TOPROL XL 50 mg 24 hr tablet, , Disp: , Rfl:       Objective:     Vitals:  Blood pressure 117/81, temperature 98.2 °F (36.8 °C), weight 101.7 kg (224 lb 4.8 oz).    Physical Examination:   GEN: wn/wd female in no apparent distress  SKIN: no rashes, no sclerodactyly, no Raynaud's, no periungual erythema, no digital tip ulcerations, no nailbed pitting  HEAD: no alopecia, no scalp tenderness, no temporal artery tenderness or induration.  EYES: no pallor, no icterus, PERRLA  ENT:  no thrush, no mucosal dryness or ulcerations, adequate oral hygiene & dentition.  NECK: supple x 6, no masses, no thyromegaly, no lymphadenopathy.  CV:   S1 and S2 regular, no murmurs, gallop or rubs  CHEST: Normal respiratory effort;  normal breath sounds/no adventitious sounds. No signs of consolidation.  ABD: non-tender and non-distended; soft; normal bowel sounds; no rebound/guarding or tenderness. No hepatosplenomegaly.  Musculoskeletal:  No evidence of inflammatory arthritis.  No peripheral deformity  EXTREM: no clubbing, cyanosis or edema. normal pulses.  NEURO:  grossly intact; motor/sensory WNL; no tremors  PSYCH:  normal mood, affect and behavior    Labs:   Lab Results   Component Value Date    WBC 4.9 (L) 06/12/2018    HGB 13.1 06/12/2018    HCT 39.4 06/12/2018    MCV 94.9 06/12/2018     06/12/2018   CMP@  Sodium   Date Value Ref Range Status   06/12/2018 139 134 - 144 mmol/L      Potassium   Date Value Ref Range Status    06/12/2018 4.3 3.5 - 5.0 mmol/L      Chloride   Date Value Ref Range Status   06/12/2018 106 98 - 110 mmol/L      CO2   Date Value Ref Range Status   06/12/2018 26.8 22.8 - 31.6 mmol/L      Glucose   Date Value Ref Range Status   06/12/2018 99 70 - 99 mg/dL      BUN, Bld   Date Value Ref Range Status   06/12/2018 11 8 - 20 mg/dL      Creatinine   Date Value Ref Range Status   06/12/2018 0.72 0.60 - 1.40 mg/dL      Calcium   Date Value Ref Range Status   06/12/2018 9.5 7.7 - 10.4 mg/dL      Total Protein   Date Value Ref Range Status   06/12/2018 7.3 6.0 - 8.2 g/dL      Albumin   Date Value Ref Range Status   06/12/2018 4.2 3.1 - 4.7 g/dL      Total Bilirubin   Date Value Ref Range Status   06/12/2018 0.7 0.3 - 1.0 mg/dL      Alkaline Phosphatase   Date Value Ref Range Status   06/12/2018 46 40 - 104 IU/L      AST   Date Value Ref Range Status   06/12/2018 34 10 - 40 IU/L      CPK   Date Value Ref Range Status   03/06/2018 230 (H) 13 - 135 IU/L      CRP   Date Value Ref Range Status   06/12/2018 0.28 0.00 - 1.40 mg/dL      Rheumatoid Factor   Date Value Ref Range Status   06/12/2018 <10.0 0.0 - 13.9 IU/mL      Comment:     Performed at: [facility], [facility] [address], [id number] Pace MD, Phone:  [phone]       Radiology/Diagnostic Studies:    None    Assessment/Discussion/Plan:   59 y.o. female with fibromyalgia-stable on Cymbalta 30 mg daily and cyclobenzaprine 10 mg nightly    2) osteoarthritis-well controlled on Celebrex 200-400 mg daily    PLAN:  I will continue her medication without change.  A BMP was ordered.    RTC:  I will see her back in 6 months    Electronically signed by Ulices Toledo MD

## 2020-06-10 NOTE — LETTER
Marta 10, 2020      [facility]  [address] [place] [address]           [facility] - Rheumatology  [address] [facility]  [address] [place] [address]  Phone: [phone]  Fax: [phone]          Patient: Urszula Dupree   MR Number: [id number]   YOB: 1960   Date of Visit: 6/10/2020       Dear [facility]:    Thank you for referring Urszula Dupree to me for evaluation. Attached you will find relevant portions of my assessment and plan of care.    If you have questions, please do not hesitate to call me. I look forward to following Urszula Dupree along with you.    Sincerely,    Ulices Toledo MD    Enclosure  CC:  No Recipients    If you would like to receive this communication electronically, please contact [email or web address] or [phone] to request more information on [employer] access.    For providers and/or their staff who would like to refer a patient to [facility], please contact us through our one-stop-shop provider referral line, [facility], at [phone].    If you feel you have received this communication in error or would no longer like to receive these types of communications, please e-mail [email or web address]

## 2020-10-07 ENCOUNTER — OFFICE VISIT (OUTPATIENT)
Dept: RHEUMATOLOGY | Facility: CLINIC | Age: 60
End: 2020-10-07
Payer: COMMERCIAL

## 2020-10-07 ENCOUNTER — LAB VISIT (OUTPATIENT)
Dept: LAB | Facility: HOSPITAL | Age: 60
End: 2020-10-07
Attending: INTERNAL MEDICINE
Payer: OTHER GOVERNMENT

## 2020-10-07 VITALS
TEMPERATURE: 98 F | DIASTOLIC BLOOD PRESSURE: 84 MMHG | BODY MASS INDEX: 36.33 KG/M2 | SYSTOLIC BLOOD PRESSURE: 128 MMHG | WEIGHT: 225.13 LBS

## 2020-10-07 DIAGNOSIS — M19.90 OSTEOARTHRITIS, UNSPECIFIED OSTEOARTHRITIS TYPE, UNSPECIFIED SITE: ICD-10-CM

## 2020-10-07 DIAGNOSIS — M79.7 FIBROMYALGIA: Primary | ICD-10-CM

## 2020-10-07 LAB
ANION GAP SERPL CALC-SCNC: 10 MMOL/L (ref 8–16)
BUN SERPL-MCNC: 18 MG/DL (ref 6–20)
CALCIUM SERPL-MCNC: 10.1 MG/DL (ref 8.7–10.5)
CHLORIDE SERPL-SCNC: 104 MMOL/L (ref 95–110)
CO2 SERPL-SCNC: 26 MMOL/L (ref 23–29)
CREAT SERPL-MCNC: 0.8 MG/DL (ref 0.5–1.4)
EST. GFR  (AFRICAN AMERICAN): >60 ML/MIN/1.73 M^2
EST. GFR  (NON AFRICAN AMERICAN): >60 ML/MIN/1.73 M^2
GLUCOSE SERPL-MCNC: 122 MG/DL (ref 70–110)
POTASSIUM SERPL-SCNC: 4 MMOL/L (ref 3.5–5.1)
SODIUM SERPL-SCNC: 140 MMOL/L (ref 136–145)

## 2020-10-07 PROCEDURE — 99213 OFFICE O/P EST LOW 20 MIN: CPT | Mod: S$GLB,,, | Performed by: INTERNAL MEDICINE

## 2020-10-07 PROCEDURE — 80048 BASIC METABOLIC PNL TOTAL CA: CPT

## 2020-10-07 PROCEDURE — 36415 COLL VENOUS BLD VENIPUNCTURE: CPT

## 2020-10-07 PROCEDURE — 99213 PR OFFICE/OUTPT VISIT, EST, LEVL III, 20-29 MIN: ICD-10-PCS | Mod: S$GLB,,, | Performed by: INTERNAL MEDICINE

## 2020-10-07 RX ORDER — TRAMADOL HYDROCHLORIDE 50 MG/1
50 TABLET ORAL 2 TIMES DAILY PRN
Qty: 60 TABLET | Refills: 5 | Status: SHIPPED | OUTPATIENT
Start: 2020-10-07 | End: 2021-04-19

## 2020-10-07 NOTE — PROGRESS NOTES
[facility] RHEUMATOLOGY           Follow-up visit    Notes dictated to M*Modal. Please forgive any unintended errors.  Subjective:       Patient ID:   NAME: Urszula Dupree : 1960     60 y.o. female    Referring Doc: [place] Levy [facility]  Other Physicians:    Chief Complaint:  Osteoarthritis and Fibromyalgia      HPI/Interval History:  The patient is generally doing well.  Continues to have some joint pain but has not noted any red, hot, and/or swollen joints.  Her moods have been stable, her stamina has increased, and she is sleeping well.    ROS:   GEN:    no fever, night sweats or weight loss  SKIN:   no rashes, bruising, or swelling, no Raynauds, no photosensitivity  HEENT: no changes in vision, no mouth ulcers, no sicca symptoms, no scalp tenderness, no jaw claudication.  CV:      no CP, PND, WILLIAMSON, orthopnea, no palpitations  PULM: no SOB, cough, hemoptysis, sputum or pleuritic pain  GI:        no dysphagia, no GERD, no hematemesis, no abdominal pain, nausea, vomiting, constipation, diarrhea, melanotic stools, BRBPR  :      no hematuria, dysuria  NEURO: no paresthesias, headaches, acute visual disturbances  MUSCULOSKELETAL:  As above  PSYCH:   No increased insomnia, no increased anxiety, no increased depression    Past Medical/Surgical History:  Past Medical History:   Diagnosis Date    Arthritis     Fibromyalgia     dx'd  in [place], [place]    GERD (gastroesophageal reflux disease)     Hypertension     Osteoarthritis of knee, unilateral     Sleep apnea     dx'd  - [facility] cpap [facility]     Past Surgical History:   Procedure Laterality Date    APPENDECTOMY      HERNIA REPAIR      Incisional hernia repair    HYSTERECTOMY  2010    Complete in two separate surgeries    INNER EAR SURGERY Bilateral     Perforated ear drum    laproscopic cholecystectom      REPAIR OF MENISCUS OF KNEE Right 2018    TONSILLECTOMY         Allergies:  Review of patient's allergies indicates:   Allergen Reactions     Codeine Nausea Only     She states it upsets her stomach.        Social/Family History:  Social History     Socioeconomic History    Marital status: Single     Spouse name: Not on file    Number of children: Not on file    Years of education: Not on file    Highest education level: Not on file   Occupational History    Not on file   Social Needs    Financial resource strain: Not on file    Food insecurity     Worry: Not on file     Inability: Not on file    Transportation needs     Medical: Not on file     Non-medical: Not on file   Tobacco Use    Smoking status: Never Smoker    Smokeless tobacco: Never Used   Substance and Sexual Activity    Alcohol use: No    Drug use: No    Sexual activity: Yes     Partners: Male   Lifestyle    Physical activity     Days per week: Not on file     Minutes per session: Not on file    Stress: Not on file   Relationships    Social connections     Talks on phone: Not on file     Gets together: Not on file     Attends Mandaen service: Not on file     Active member of club or organization: Not on file     Attends meetings of clubs or organizations: Not on file     Relationship status: Not on file   Other Topics Concern    Not on file   Social History Narrative    Not on file     Family History   Problem Relation Age of Onset    Heart disease Mother     Arthritis Mother     Cancer Father     Arthritis Father     Rheum arthritis Daughter      FAMILY HISTORY: negative for Connective Tissue Disease      Medications:    Current Outpatient Medications:     celecoxib (CELEBREX) 200 MG capsule, Take 1 capsule (200 mg total) by mouth 2 (two) times daily., Disp: 90 capsule, Rfl: 3    cholecalciferol, vitamin D3, 1,250 mcg (50,000 unit) capsule, , Disp: , Rfl:     cyclobenzaprine (FLEXERIL) 10 MG tablet, Take 10 mg by mouth 3 (three) times daily as needed for Muscle spasms., Disp: , Rfl:     diclofenac sodium (VOLTAREN) 1 % Gel, Apply 2 g topically 4 (four) times  daily., Disp: , Rfl:     hydroCHLOROthiazide (HYDRODIURIL) 12.5 MG Tab, , Disp: , Rfl:     lidocaine (LIDODERM) 5 %, Place 1 patch onto the skin every 24 hours. Remove & Discard patch within 12 hours or as directed by MD, Disp: , Rfl:     pantoprazole (PROTONIX) 20 MG tablet, Take 20 mg by mouth once daily., Disp: , Rfl:     phentermine (ADIPEX-P) 37.5 mg tablet, , Disp: , Rfl:     pravastatin (PRAVACHOL) 40 MG tablet, , Disp: , Rfl:     tiZANidine 4 mg Cap, , Disp: , Rfl:     TOPROL XL 50 mg 24 hr tablet, , Disp: , Rfl:     DULoxetine (CYMBALTA) 30 MG capsule, Take 1 capsule (30 mg total) by mouth once daily., Disp: 90 capsule, Rfl: 1    traMADoL (ULTRAM) 50 mg tablet, Take 1 tablet (50 mg total) by mouth 2 (two) times daily as needed for Pain., Disp: 60 tablet, Rfl: 5      Objective:     Vitals:  Blood pressure 128/84, temperature 97.5 °F (36.4 °C), weight 102.1 kg (225 lb 1.6 oz).    Physical Examination:   GEN: wn/wd female in no apparent distress  SKIN: no rashes, no sclerodactyly, no Raynaud's, no periungual erythema, no digital tip ulcerations, no nailbed pitting  HEAD: no alopecia, no scalp tenderness, no temporal artery tenderness or induration.  EYES: no pallor, no icterus, PERRLA  ENT:  no thrush, no mucosal dryness or ulcerations, adequate oral hygiene & dentition.  NECK: supple x 6, no masses, no thyromegaly, no lymphadenopathy.  CV:   S1 and S2 regular, no murmurs, gallop or rubs  CHEST: Normal respiratory effort;  normal breath sounds/no adventitious sounds. No signs of consolidation.  ABD: non-tender and non-distended; soft; normal bowel sounds; no rebound/guarding or tenderness. No hepatosplenomegaly.  Musculoskeletal:  No evidence of inflammatory disease  EXTREM: no clubbing, cyanosis or edema. normal pulses.  NEURO:  grossly intact; motor/sensory WNL; no tremors  PSYCH:  normal mood, affect and behavior    Labs:   Lab Results   Component Value Date    WBC 4.9 (L) 06/12/2018    HGB 13.1  06/12/2018    HCT 39.4 06/12/2018    MCV 94.9 06/12/2018     06/12/2018   CMP@  Sodium   Date Value Ref Range Status   06/12/2018 139 134 - 144 mmol/L      Potassium   Date Value Ref Range Status   06/12/2018 4.3 3.5 - 5.0 mmol/L      Chloride   Date Value Ref Range Status   06/12/2018 106 98 - 110 mmol/L      CO2   Date Value Ref Range Status   06/12/2018 26.8 22.8 - 31.6 mmol/L      Glucose   Date Value Ref Range Status   06/12/2018 99 70 - 99 mg/dL      BUN, Bld   Date Value Ref Range Status   06/12/2018 11 8 - 20 mg/dL      Creatinine   Date Value Ref Range Status   06/12/2018 0.72 0.60 - 1.40 mg/dL      Calcium   Date Value Ref Range Status   06/12/2018 9.5 7.7 - 10.4 mg/dL      Total Protein   Date Value Ref Range Status   06/12/2018 7.3 6.0 - 8.2 g/dL      Albumin   Date Value Ref Range Status   06/12/2018 4.2 3.1 - 4.7 g/dL      Total Bilirubin   Date Value Ref Range Status   06/12/2018 0.7 0.3 - 1.0 mg/dL      Alkaline Phosphatase   Date Value Ref Range Status   06/12/2018 46 40 - 104 IU/L      AST   Date Value Ref Range Status   06/12/2018 34 10 - 40 IU/L      CPK   Date Value Ref Range Status   03/06/2018 230 (H) 13 - 135 IU/L      CRP   Date Value Ref Range Status   06/12/2018 0.28 0.00 - 1.40 mg/dL      Rheumatoid Factor   Date Value Ref Range Status   06/12/2018 <10.0 0.0 - 13.9 IU/mL      Comment:     Performed at: [facility], [facility] [address], [place], [id number] Pace MD, Phone:  [phone]       Radiology/Diagnostic Studies:    None    Assessment/Discussion/Plan:   60 y.o. female with fibromyalgia-well controlled on Cymbalta 30 mg daily and Flexeril 10 mg nightly  2) osteoarthritis-improved but still active on Celebrex 200-400 mg daily    PLAN:  I have given her a prescription for tramadol 50 mg twice daily.  She was instructed to use 1 if she finds the Celebrex was not effective in allowing her to perform her usual chores and ADLs.  A BMP was ordered.    RTC:   I will see her back in 4-6 months    Electronically signed by Ulices Toledo MD

## 2020-10-07 NOTE — LETTER
October 7, 2020      [facility]  [address]  [place] [address]           [facility] - Rheumatology  [address] [facility]  [address] [place] [id number]  Phone: [phone]  Fax: [phone]          Patient: Urszula Dupree   MR Number: [id number]   YOB: 1960   Date of Visit: 10/7/2020       Dear [facility]:    Thank you for referring Urszula Dupree to me for evaluation. Attached you will find relevant portions of my assessment and plan of care.    If you have questions, please do not hesitate to call me. I look forward to following Urszula Dupree along with you.    Sincerely,    Ulices Toledo MD    Enclosure  CC:  No Recipients    If you would like to receive this communication electronically, please contact [email or web address] or [phone] to request more information on [facility] [employer] access.    For providers and/or their staff who would like to refer a patient to [facility], please contact us through our one-stop-shop provider referral line, [facility], at [phone].    If you feel you have received this communication in error or would no longer like to receive these types of communications, please e-mail [email or web address]

## 2021-01-06 ENCOUNTER — TELEPHONE (OUTPATIENT)
Dept: RHEUMATOLOGY | Facility: CLINIC | Age: 61
End: 2021-01-06

## 2021-01-11 ENCOUNTER — OFFICE VISIT (OUTPATIENT)
Dept: RHEUMATOLOGY | Facility: CLINIC | Age: 61
End: 2021-01-11
Payer: OTHER GOVERNMENT

## 2021-01-11 VITALS
SYSTOLIC BLOOD PRESSURE: 127 MMHG | BODY MASS INDEX: 37.25 KG/M2 | TEMPERATURE: 97 F | DIASTOLIC BLOOD PRESSURE: 87 MMHG | WEIGHT: 230.81 LBS

## 2021-01-11 DIAGNOSIS — M19.90 OSTEOARTHRITIS, UNSPECIFIED OSTEOARTHRITIS TYPE, UNSPECIFIED SITE: ICD-10-CM

## 2021-01-11 DIAGNOSIS — Z79.899 ENCOUNTER FOR LONG-TERM (CURRENT) DRUG USE: Primary | ICD-10-CM

## 2021-01-11 DIAGNOSIS — M79.7 FIBROMYALGIA: ICD-10-CM

## 2021-01-11 PROCEDURE — 99214 OFFICE O/P EST MOD 30 MIN: CPT | Mod: S$GLB,,, | Performed by: INTERNAL MEDICINE

## 2021-01-11 PROCEDURE — 99214 PR OFFICE/OUTPT VISIT, EST, LEVL IV, 30-39 MIN: ICD-10-PCS | Mod: S$GLB,,, | Performed by: INTERNAL MEDICINE

## 2021-01-11 RX ORDER — METHOCARBAMOL 750 MG/1
TABLET, FILM COATED ORAL
COMMUNITY
Start: 2020-12-03

## 2021-01-11 RX ORDER — POTASSIUM CHLORIDE 1500 MG/1
TABLET, EXTENDED RELEASE ORAL
COMMUNITY
Start: 2020-12-02

## 2021-01-11 RX ORDER — DULOXETIN HYDROCHLORIDE 60 MG/1
60 CAPSULE, DELAYED RELEASE ORAL DAILY
Qty: 90 CAPSULE | Refills: 1 | Status: SHIPPED | OUTPATIENT
Start: 2021-01-11 | End: 2022-01-11

## 2021-01-11 RX ORDER — ACETAMINOPHEN AND CODEINE PHOSPHATE 300; 30 MG/1; MG/1
TABLET ORAL
COMMUNITY
Start: 2020-12-21

## 2021-04-20 ENCOUNTER — OFFICE VISIT (OUTPATIENT)
Dept: RHEUMATOLOGY | Facility: CLINIC | Age: 61
End: 2021-04-20
Payer: OTHER GOVERNMENT

## 2021-04-20 VITALS
WEIGHT: 236.81 LBS | BODY MASS INDEX: 38.22 KG/M2 | DIASTOLIC BLOOD PRESSURE: 78 MMHG | SYSTOLIC BLOOD PRESSURE: 113 MMHG

## 2021-04-20 DIAGNOSIS — M79.7 FIBROMYALGIA: Primary | ICD-10-CM

## 2021-04-20 DIAGNOSIS — M19.90 OSTEOARTHRITIS, UNSPECIFIED OSTEOARTHRITIS TYPE, UNSPECIFIED SITE: ICD-10-CM

## 2021-04-20 PROCEDURE — 99214 OFFICE O/P EST MOD 30 MIN: CPT | Mod: S$GLB,,, | Performed by: INTERNAL MEDICINE

## 2021-04-20 PROCEDURE — 99214 PR OFFICE/OUTPT VISIT, EST, LEVL IV, 30-39 MIN: ICD-10-PCS | Mod: S$GLB,,, | Performed by: INTERNAL MEDICINE

## 2021-04-20 RX ORDER — AMITRIPTYLINE HYDROCHLORIDE 25 MG/1
25 TABLET, FILM COATED ORAL NIGHTLY
Qty: 30 TABLET | Refills: 5 | Status: SHIPPED | OUTPATIENT
Start: 2021-04-20 | End: 2022-04-20

## 2021-04-20 RX ORDER — MELOXICAM 15 MG/1
15 TABLET ORAL DAILY
Qty: 30 TABLET | Refills: 5 | Status: SHIPPED | OUTPATIENT
Start: 2021-04-20

## 2021-04-20 RX ORDER — AMITRIPTYLINE HYDROCHLORIDE 10 MG/1
TABLET, FILM COATED ORAL
COMMUNITY
Start: 2021-01-21 | End: 2021-04-20

## 2021-04-20 RX ORDER — TRAMADOL HYDROCHLORIDE 50 MG/1
50 TABLET ORAL 2 TIMES DAILY PRN
Qty: 60 TABLET | Refills: 5 | Status: SHIPPED | OUTPATIENT
Start: 2021-04-20

## 2022-11-10 ENCOUNTER — OFFICE VISIT (OUTPATIENT)
Dept: PODIATRY | Facility: CLINIC | Age: 62
End: 2022-11-10
Payer: OTHER GOVERNMENT

## 2022-11-10 VITALS
WEIGHT: 240.63 LBS | HEIGHT: 66 IN | BODY MASS INDEX: 38.67 KG/M2 | SYSTOLIC BLOOD PRESSURE: 131 MMHG | HEART RATE: 79 BPM | DIASTOLIC BLOOD PRESSURE: 94 MMHG

## 2022-11-10 DIAGNOSIS — R26.2 DIFFICULTY WALKING: ICD-10-CM

## 2022-11-10 DIAGNOSIS — M79.672 LEFT FOOT PAIN: ICD-10-CM

## 2022-11-10 DIAGNOSIS — M72.2 PLANTAR FASCIITIS: Primary | ICD-10-CM

## 2022-11-10 PROCEDURE — 99203 PR OFFICE/OUTPT VISIT, NEW, LEVL III, 30-44 MIN: ICD-10-PCS | Mod: S$GLB,,, | Performed by: PODIATRIST

## 2022-11-10 PROCEDURE — 99203 OFFICE O/P NEW LOW 30 MIN: CPT | Mod: S$GLB,,, | Performed by: PODIATRIST

## 2022-11-10 RX ORDER — PRIMAQUINE PHOSPHATE 15 MG/1
52.6 TABLET ORAL DAILY
COMMUNITY
Start: 2022-10-29

## 2022-11-10 RX ORDER — NALOXONE HYDROCHLORIDE 4 MG/.1ML
SPRAY NASAL
COMMUNITY
Start: 2022-06-29

## 2022-11-10 RX ORDER — DICLOFENAC SODIUM 75 MG/1
75 TABLET, DELAYED RELEASE ORAL 2 TIMES DAILY
COMMUNITY
Start: 2022-10-25

## 2022-11-10 RX ORDER — PANTOPRAZOLE SODIUM 40 MG/1
40 TABLET, DELAYED RELEASE ORAL 2 TIMES DAILY
COMMUNITY
Start: 2022-05-27

## 2022-11-10 RX ORDER — OXYCODONE AND ACETAMINOPHEN 7.5; 325 MG/1; MG/1
1 TABLET ORAL 3 TIMES DAILY PRN
COMMUNITY
Start: 2022-10-28

## 2022-11-10 RX ORDER — POTASSIUM CHLORIDE 20 MEQ/1
20 TABLET, EXTENDED RELEASE ORAL DAILY
COMMUNITY
Start: 2022-10-17

## 2022-11-10 RX ORDER — ERGOCALCIFEROL 1.25 MG/1
CAPSULE ORAL
COMMUNITY
Start: 2022-10-14

## 2022-11-10 RX ORDER — BENZONATATE 100 MG/1
CAPSULE ORAL
COMMUNITY
Start: 2022-10-17

## 2022-11-10 RX ORDER — METOPROLOL SUCCINATE 100 MG/1
TABLET, EXTENDED RELEASE ORAL
COMMUNITY
Start: 2022-10-17

## 2022-11-10 RX ORDER — FLUTICASONE PROPIONATE 50 MCG
SPRAY, SUSPENSION (ML) NASAL
COMMUNITY
Start: 2022-10-14

## 2022-11-10 RX ORDER — BUSPIRONE HYDROCHLORIDE 10 MG/1
10 TABLET ORAL 3 TIMES DAILY
COMMUNITY
Start: 2022-10-04

## 2022-11-10 RX ORDER — LOSARTAN POTASSIUM 100 %
POWDER (GRAM) MISCELLANEOUS
COMMUNITY

## 2022-11-10 RX ORDER — AZITHROMYCIN 250 MG/1
TABLET, FILM COATED ORAL
COMMUNITY
Start: 2022-08-04

## 2022-11-10 RX ORDER — ALBUTEROL SULFATE 90 UG/1
AEROSOL, METERED RESPIRATORY (INHALATION)
COMMUNITY
Start: 2022-10-17

## 2022-11-10 RX ORDER — FAMOTIDINE 20 MG/1
20 TABLET, FILM COATED ORAL DAILY
COMMUNITY
Start: 2022-10-27

## 2022-11-10 RX ORDER — DOXYCYCLINE 100 MG/1
100 CAPSULE ORAL 2 TIMES DAILY
COMMUNITY
Start: 2022-10-24

## 2022-11-10 RX ORDER — METRONIDAZOLE 250 MG/1
500 TABLET ORAL 2 TIMES DAILY
COMMUNITY
Start: 2022-10-26

## 2022-11-10 RX ORDER — ONDANSETRON 4 MG/1
4 TABLET, ORALLY DISINTEGRATING ORAL DAILY PRN
COMMUNITY
Start: 2022-10-19

## 2022-11-10 RX ORDER — DICLOFENAC POTASSIUM 50 MG/1
TABLET, FILM COATED ORAL
COMMUNITY
Start: 2022-10-13

## 2022-11-10 RX ORDER — DICYCLOMINE HYDROCHLORIDE 20 MG/1
20 TABLET ORAL 2 TIMES DAILY
COMMUNITY
Start: 2022-07-22

## 2022-11-10 NOTE — PROGRESS NOTES
Subjective:      Patient ID: Urszula Dupree is a 62 y.o. female.    Chief Complaint: Foot Pain    Urszula is a 62 y.o. female who presents to the clinic complaining of heel pain in the right foot, especially with the first step in the morning. The pain is described as Burning, Throbbing, Grabbing, and Tight. The onset of the pain was sudden and has worsened slightly over the past several days. Urszula rates the pain as 8/10. She denies a history of trauma. Prior treatments include none.  Patient states that she recently returned from a trip to [facility] where she did some increased activity and walking.     Review of Systems   Constitutional: Negative for chills and fever.   Cardiovascular:  Negative for chest pain and leg swelling.   Respiratory:  Negative for cough and shortness of breath.    Gastrointestinal:  Negative for diarrhea, nausea and vomiting.         Objective:      Physical Exam  Vitals reviewed.   Constitutional:       General: She is not in acute distress.     Appearance: Normal appearance. She is not ill-appearing.   HENT:      Nose: Nose normal.   Cardiovascular:      Rate and Rhythm: Normal rate.   Pulmonary:      Effort: Pulmonary effort is normal. No respiratory distress.   Skin:     Capillary Refill: Capillary refill takes 2 to 3 seconds.   Neurological:      Mental Status: She is alert and oriented to person, place, and time.   Psychiatric:         Mood and Affect: Mood normal.         Behavior: Behavior normal.         Thought Content: Thought content normal.         Judgment: Judgment normal.     Musculoskeletal:  5/5 muscle strength noted bilateral foot, medial deviation of bilateral 1st MPJ with prominent dorsal medial eminence left foot greater than right indicative of bunion deformity, pain and tenderness with palpation plantar aspect of the left foot at the calcaneal medial tubercle and along the medial band of the plantar fascia at the medial arch, no masses noted bilateral  foot  Neurologic:  Protective and light touch sensation intact bilateral lower extremity   Vascular:  DP and PT pulses palpable 2/4 bilateral foot, capillary refill time less than 3 seconds to the digits   Dermatologic:  No open lesions noted bilateral foot, no rashes noted bilateral foot, nails 1 through 5 bilateral within normal limits of length and thickness        Assessment:       Encounter Diagnoses   Name Primary?    Plantar fasciitis Yes    Left foot pain     Difficulty walking          Plan:       Urszula was seen today for foot pain.    Diagnoses and all orders for this visit:    Plantar fasciitis    Left foot pain    Difficulty walking      I counseled the patient on her conditions, their implications and medical management.        1. Patient was examined and evaluated  2. Discussed with patient etiology of plantar fasciitis.  Discussed conservative treatment options with patient.  Discussed oral NSAIDs, local steroid injection, shoe gear modification, and rest.  Stretching exercises were discussed in detail with the patient.  Stretching exercises were then printed dispensed to the patient for home reference.  Patient will continue with oral diclofenac for improvement of her plantar fasciitis symptoms.    3. Patient was advised to consider use of tennis shoes until the foot improves  4. Patient will follow-up in 2-3 weeks p.r.n. for complaints

## 2022-12-22 ENCOUNTER — OFFICE VISIT (OUTPATIENT)
Dept: PODIATRY | Facility: CLINIC | Age: 62
End: 2022-12-22
Payer: OTHER GOVERNMENT

## 2022-12-22 VITALS
DIASTOLIC BLOOD PRESSURE: 87 MMHG | WEIGHT: 237.19 LBS | HEIGHT: 66 IN | BODY MASS INDEX: 38.12 KG/M2 | SYSTOLIC BLOOD PRESSURE: 131 MMHG

## 2022-12-22 DIAGNOSIS — M25.572 SINUS TARSITIS OF LEFT FOOT: ICD-10-CM

## 2022-12-22 DIAGNOSIS — M67.472 GANGLION CYST OF LEFT FOOT: Primary | ICD-10-CM

## 2022-12-22 DIAGNOSIS — M20.12 HALLUX VALGUS OF LEFT FOOT: ICD-10-CM

## 2022-12-22 PROCEDURE — 99213 OFFICE O/P EST LOW 20 MIN: CPT | Mod: S$GLB,,, | Performed by: PODIATRIST

## 2022-12-22 PROCEDURE — 99213 PR OFFICE/OUTPT VISIT, EST, LEVL III, 20-29 MIN: ICD-10-PCS | Mod: S$GLB,,, | Performed by: PODIATRIST

## 2022-12-22 NOTE — PROGRESS NOTES
Subjective:      Patient ID: Urszula Dupree is a 62 y.o. female.    Chief Complaint: Foot Problem    Urszula is a 62 y.o. female who presents to the podiatry clinic  with complaint of  left foot pain. Onset of the symptoms was several months ago. Precipitating event: none known. Current symptoms include: ability to bear weight, but with some pain and pain at the lateral aspect of the ankle. Aggravating factors:  prolonged walking and ill fitted shoes . Symptoms have waxed and waned. Patient has had prior foot problems. Evaluation to date: none. Treatment to date: OTC analgesics which are somewhat effective and rest. Patients rates pain 8/10 on pain scale.    Review of Systems   Constitutional: Negative for chills and fever.   Cardiovascular:  Negative for chest pain and leg swelling.   Respiratory:  Negative for cough and shortness of breath.    Gastrointestinal:  Negative for diarrhea, nausea and vomiting.         Objective:      Physical Exam  Vitals reviewed.   Constitutional:       General: She is not in acute distress.     Appearance: Normal appearance. She is not ill-appearing.   HENT:      Nose: Nose normal.   Cardiovascular:      Rate and Rhythm: Normal rate.   Pulmonary:      Effort: Pulmonary effort is normal. No respiratory distress.   Skin:     Capillary Refill: Capillary refill takes 2 to 3 seconds.   Neurological:      Mental Status: She is alert and oriented to person, place, and time.   Psychiatric:         Mood and Affect: Mood normal.         Behavior: Behavior normal.         Thought Content: Thought content normal.         Judgment: Judgment normal.     Musculoskeletal:  5/5 muscle strength noted bilateral foot, medial deviation of bilateral 1st MPJ with prominent dorsal medial eminence left foot greater than right indicative of bunion deformity, pain and tenderness with palpation plantar aspect of the left foot at the calcaneal medial tubercle and along the medial band of the plantar fascia at the  medial arch, soft tissue fluid fill mass noted to the lateral aspect of the left lateral ankle gutter-lesion is freely movable and has no pain with direct palpation, patient with painful range of motion of eversion and inversion of the left rear foot with some tenderness with palpation of the sinus tarsi area  Neurologic:  Protective and light touch sensation intact bilateral lower extremity   Vascular:  DP and PT pulses palpable 2/4 bilateral foot, capillary refill time less than 3 seconds to the digits   Dermatologic:  No open lesions noted bilateral foot, no rashes noted bilateral foot, nails 1 through 5 bilateral within normal limits of length and thickness        Assessment:       Encounter Diagnoses   Name Primary?    Ganglion cyst of left foot Yes    Sinus tarsitis of left foot     Hallux valgus of left foot          Plan:       Urszula was seen today for foot problem.    Diagnoses and all orders for this visit:    Ganglion cyst of left foot    Sinus tarsitis of left foot    Hallux valgus of left foot      I counseled the patient on her conditions, their implications and medical management.        1. Patient was examined and evaluated  2. Discussed with patient etiology of ganglionic cyst.  Discussed conservative treatment options including change in shoe gear for reduction of pressure.  Oral Medrol Dosepak versus local steroid injection to the area, and ultimately possible surgical excision.  Patient made aware of recurrence.    3. Discussed with patient etiology sinus tarsi this.  Patient was made aware that it is linked to her previous multiple history of ankle sprains to the left foot.  Patient will consider possible local steroid injection if symptoms persist or worsen over time.    4. Discussed with patient etiology of bunion deformity.  Discussed conservative treatment options including prescription NSAIDs and shoe gear modification including increased toe box height and width and selection of shoe gear  with soft stretchable uppers.  Discussed with patient possible local steroid injection for improvement of any pain in the area.    5. Patient was advised continue with comfortable shoe gear both inside and outside the home  6. Patient was advised to adjunct with OTC NSAIDs including ibuprofen.  Patient will attempt to take 600 - 800 mg as necessary for pain relief.  Patient was advised to discontinue oral diclofenac  7. Patient will follow up in 1 month or p.r.n. for complaints

## 2023-01-23 ENCOUNTER — OFFICE VISIT (OUTPATIENT)
Dept: PODIATRY | Facility: CLINIC | Age: 63
End: 2023-01-23
Payer: OTHER GOVERNMENT

## 2023-01-23 DIAGNOSIS — R26.2 DIFFICULTY WALKING: ICD-10-CM

## 2023-01-23 DIAGNOSIS — M72.2 PLANTAR FASCIITIS: Primary | ICD-10-CM

## 2023-01-23 DIAGNOSIS — M79.672 LEFT FOOT PAIN: ICD-10-CM

## 2023-01-23 PROCEDURE — 99212 PR OFFICE/OUTPT VISIT, EST, LEVL II, 10-19 MIN: ICD-10-PCS | Mod: S$GLB,,, | Performed by: PODIATRIST

## 2023-01-23 PROCEDURE — 99212 OFFICE O/P EST SF 10 MIN: CPT | Mod: S$GLB,,, | Performed by: PODIATRIST

## 2023-01-23 NOTE — PROGRESS NOTES
Subjective:      Patient ID: Urszula Dupree is a 62 y.o. female.    Chief Complaint: Heel Pain    Urszula is a 62 y.o. female who presents to the podiatry clinic  with complaint of  left foot pain. Onset of the symptoms was several weeks ago. Precipitating event: increased activity. Current symptoms include: ability to bear weight, but with some pain, stiffness, and worsening symptoms after a period of activity. Aggravating factors: standing and walking. Symptoms have gradually worsened. Patient has had prior foot problems. Treatment to date: avoidance of offending activity, ice, and rest. Patients rates pain 0/10 on pain scale.    Review of Systems   Constitutional: Negative for chills and fever.   Cardiovascular:  Negative for chest pain and leg swelling.   Respiratory:  Negative for cough and shortness of breath.    Gastrointestinal:  Negative for diarrhea, nausea and vomiting.         Objective:      Physical Exam  Vitals reviewed.   Constitutional:       General: She is not in acute distress.     Appearance: Normal appearance. She is not ill-appearing.   HENT:      Head: Normocephalic.      Nose: Nose normal.   Cardiovascular:      Rate and Rhythm: Normal rate.   Pulmonary:      Effort: Pulmonary effort is normal. No respiratory distress.   Skin:     Capillary Refill: Capillary refill takes 2 to 3 seconds.   Neurological:      Mental Status: She is alert and oriented to person, place, and time.   Psychiatric:         Mood and Affect: Mood normal.         Behavior: Behavior normal.         Thought Content: Thought content normal.         Judgment: Judgment normal.     Musculoskeletal:  5/5 muscle strength noted bilateral foot, medial deviation of bilateral 1st MPJ with prominent dorsal medial eminence left foot greater than right indicative of bunion deformity, pain and tenderness with palpation plantar aspect of the left foot at the calcaneal medial tubercle and along the medial band of the plantar fascia at the  medial arch, no masses noted bilateral foot  Neurologic:  Protective and light touch sensation intact bilateral lower extremity   Vascular:  DP and PT pulses palpable 2/4 bilateral foot, capillary refill time less than 3 seconds to the digits   Dermatologic:  No open lesions noted bilateral foot, no rashes noted bilateral foot, nails 1 through 5 bilateral within normal limits of length and thickness        Assessment:       Encounter Diagnoses   Name Primary?    Plantar fasciitis Yes    Left foot pain     Difficulty walking          Plan:       Urszual was seen today for heel pain.    Diagnoses and all orders for this visit:    Plantar fasciitis    Left foot pain    Difficulty walking      I counseled the patient on her conditions, their implications and medical management.        1. Patient was examined and evaluated  2. Discussed with patient etiology of plantar fasciitis.  Discussed conservative treatment options with patient.  Discussed oral NSAIDs, local steroid injection, shoe gear modification, and rest.  Stretching exercises were discussed in detail with the patient.  Stretching exercises were then printed dispensed to the patient for home reference.  Patient will continue with oral diclofenac for improvement of her plantar fasciitis symptoms. Patient had a arch support applied to left shoe under the insert. Patient was warned of break in period with use of arch support.   3. Patient was advised to consider use of tennis shoes until the foot improves.   4. Patient will follow-up in 2-3 weeks p.r.n. for complaints

## 2023-02-06 ENCOUNTER — OFFICE VISIT (OUTPATIENT)
Dept: PODIATRY | Facility: CLINIC | Age: 63
End: 2023-02-06
Payer: OTHER GOVERNMENT

## 2023-02-06 VITALS — WEIGHT: 231.38 LBS | HEIGHT: 66 IN | BODY MASS INDEX: 37.18 KG/M2

## 2023-02-06 DIAGNOSIS — M72.2 PLANTAR FASCIITIS: Primary | ICD-10-CM

## 2023-02-06 DIAGNOSIS — M20.12 HALLUX VALGUS OF LEFT FOOT: ICD-10-CM

## 2023-02-06 DIAGNOSIS — R26.2 DIFFICULTY WALKING: ICD-10-CM

## 2023-02-06 DIAGNOSIS — M79.672 LEFT FOOT PAIN: ICD-10-CM

## 2023-02-06 PROCEDURE — 29540 PR STRAPPING; ANKLE &/OR FOOT: ICD-10-PCS | Mod: LT,S$GLB,, | Performed by: PODIATRIST

## 2023-02-06 PROCEDURE — 99213 OFFICE O/P EST LOW 20 MIN: CPT | Mod: 25,S$GLB,, | Performed by: PODIATRIST

## 2023-02-06 PROCEDURE — 29540 STRAPPING ANKLE &/FOOT: CPT | Mod: LT,S$GLB,, | Performed by: PODIATRIST

## 2023-02-06 PROCEDURE — 99213 PR OFFICE/OUTPT VISIT, EST, LEVL III, 20-29 MIN: ICD-10-PCS | Mod: 25,S$GLB,, | Performed by: PODIATRIST

## 2023-02-06 RX ORDER — GABAPENTIN 100 MG/1
100 CAPSULE ORAL 3 TIMES DAILY
COMMUNITY
Start: 2023-01-25

## 2023-02-06 RX ORDER — METHYLNALTREXONE BROMIDE 150 MG/1
3 TABLET ORAL EVERY MORNING
COMMUNITY
Start: 2023-01-31

## 2023-02-06 NOTE — PROCEDURES
"Procedures    STRAPPING OF ANKLE/TAPING OF FOOT: LOW DYE LEFT FOOT    Patient had a low dye taping applied to the left foot-1 in athletic tape applied from lateral to medial around the ankle/ heel x 2 with two plantar straps of 2 inch athletic tape applied from medial to lateral from just distal to the heel to the level of the midfoot along with a Celeste's rest strap composed of 3 " tensoplast. Keep taping clean, dry, and intact for 1 - 3 days.    "

## 2023-02-06 NOTE — PROGRESS NOTES
Subjective:      Patient ID: Urszula Dupree is a 62 y.o. female.    Chief Complaint: No chief complaint on file.    Urszula is a 62 y.o. female who presents to the clinic complaining of heel pain in the left foot, especially with the first step in the morning. The pain is described as Throbbing, Tight, and Sharp. The onset of the pain was gradual and is moderately worse over the past several weeks. Urszula rates the pain as 7/10. She denies a history of trauma. Prior treatments include shoe gear modification oral NSAIDs which have been mildly effective and occasional stretching.    Review of Systems   Constitutional: Negative for chills and fever.   Cardiovascular:  Negative for chest pain and leg swelling.   Respiratory:  Negative for cough and shortness of breath.    Gastrointestinal:  Negative for diarrhea, nausea and vomiting.         Objective:      Physical Exam  Constitutional:       General: She is not in acute distress.     Appearance: Normal appearance. She is not ill-appearing.   HENT:      Head: Normocephalic.      Nose: Nose normal.   Pulmonary:      Effort: Pulmonary effort is normal. No respiratory distress.   Skin:     Capillary Refill: Capillary refill takes 2 to 3 seconds.   Neurological:      Mental Status: She is alert and oriented to person, place, and time.   Psychiatric:         Mood and Affect: Mood normal.         Behavior: Behavior normal.         Thought Content: Thought content normal.         Judgment: Judgment normal.     Musculoskeletal:  5/5 muscle strength noted bilateral foot, medial deviation of bilateral 1st MPJ with prominent dorsal medial eminence left foot greater than right indicative of bunion deformity, pain and tenderness with palpation plantar aspect of the left foot at the calcaneal medial tubercle and along the medial band of the plantar fascia at the medial arch, no masses noted bilateral foot  Neurologic:  Protective and light touch sensation intact bilateral lower  extremity   Vascular:  DP and PT pulses palpable 2/4 bilateral foot, capillary refill time less than 3 seconds to the digits   Dermatologic:  No open lesions noted bilateral foot, no rashes noted bilateral foot, nails 1 through 5 bilateral within normal limits of length and thickness        Assessment:       Encounter Diagnoses   Name Primary?    Plantar fasciitis Yes    Left foot pain     Difficulty walking     Hallux valgus of left foot          Plan:       Diagnoses and all orders for this visit:    Plantar fasciitis    Left foot pain    Difficulty walking    Hallux valgus of left foot      I counseled the patient on her conditions, their implications and medical management.        1. Patient was examined and evaluated  2. Discussed with patient etiology of plantar fasciitis.  Discussed conservative treatment options with patient.  Discussed oral NSAIDs, local steroid injection, shoe gear modification, and rest.  Patient was advised to continue with current stretching regimen.  Patient will continue with oral diclofenac for improvement of her plantar fasciitis symptoms. Patient had a arch support applied to left shoe under the insert. Patient was warned of break in period with use of arch support.  Patient had a low dye with Celeste's rest strap applied to the left foot. Patient will keep this dressing clean dry intact for 1-3 days  3. Patient was advised to consider use of athletic shoes only until the foot improves.   4. Patient will follow-up in 2 weeks p.r.n. for complaints

## 2023-02-20 ENCOUNTER — OFFICE VISIT (OUTPATIENT)
Dept: PODIATRY | Facility: CLINIC | Age: 63
End: 2023-02-20
Payer: OTHER GOVERNMENT

## 2023-02-20 VITALS
OXYGEN SATURATION: 100 % | HEART RATE: 58 BPM | WEIGHT: 235.81 LBS | HEIGHT: 66 IN | BODY MASS INDEX: 37.9 KG/M2 | SYSTOLIC BLOOD PRESSURE: 132 MMHG | RESPIRATION RATE: 18 BRPM | DIASTOLIC BLOOD PRESSURE: 85 MMHG

## 2023-02-20 DIAGNOSIS — M72.2 PLANTAR FASCIITIS: ICD-10-CM

## 2023-02-20 DIAGNOSIS — M79.7 FIBROMYALGIA AFFECTING LOWER LEG: Primary | ICD-10-CM

## 2023-02-20 DIAGNOSIS — M79.672 LEFT FOOT PAIN: ICD-10-CM

## 2023-02-20 PROCEDURE — 99212 PR OFFICE/OUTPT VISIT, EST, LEVL II, 10-19 MIN: ICD-10-PCS | Mod: S$GLB,,, | Performed by: PODIATRIST

## 2023-02-20 PROCEDURE — 99212 OFFICE O/P EST SF 10 MIN: CPT | Mod: S$GLB,,, | Performed by: PODIATRIST

## 2023-02-22 NOTE — PROGRESS NOTES
Subjective:      Patient ID: Urszula Dupree is a 62 y.o. female.    Chief Complaint: Foot Pain    Urszula is a 62 y.o. female who presents to the clinic complaining of heel pain in the left foot, especially with the first step in the morning. The pain is described as Throbbing, Tight, Sharp, Electric, and Shooting. The onset of the pain was gradual and has worsened slightly over the past several months. Urszula rates the pain as 6/10. She denies a history of trauma. Prior treatments include local corticosteroid injection which was effective, low-dye ankle/ foot strapping which was mildly effective and use of comfortable shoes.    Review of Systems   Constitutional: Negative for chills and fever.   Cardiovascular:  Negative for chest pain and leg swelling.   Respiratory:  Negative for cough and shortness of breath.    Musculoskeletal:  Positive for myalgias (left lower extremity).   Gastrointestinal:  Negative for diarrhea, nausea and vomiting.   Neurological:  Positive for paresthesias.         Objective:      Physical Exam  Vitals reviewed.   Constitutional:       General: She is not in acute distress.     Appearance: Normal appearance. She is not ill-appearing.   HENT:      Head: Normocephalic.      Nose: Nose normal.   Cardiovascular:      Rate and Rhythm: Bradycardia present.   Pulmonary:      Effort: Pulmonary effort is normal. No respiratory distress.   Skin:     Capillary Refill: Capillary refill takes 2 to 3 seconds.   Neurological:      Mental Status: She is alert and oriented to person, place, and time.   Psychiatric:         Mood and Affect: Mood normal.         Behavior: Behavior normal.         Thought Content: Thought content normal.         Judgment: Judgment normal.     Musculoskeletal:  5/5 muscle strength noted bilateral foot, medial deviation of bilateral 1st MPJ with prominent dorsal medial eminence left foot greater than right indicative of bunion deformity, pain and tenderness with palpation  plantar aspect of the left foot at the calcaneal medial tubercle and along the medial band of the plantar fascia at the medial arch, no masses noted bilateral foot  Neurologic:  Protective and light touch sensation intact bilateral lower extremity, positive paresthesias reported left foot   Vascular:  DP and PT pulses palpable 2/4 bilateral foot, capillary refill time less than 3 seconds to the digits   Dermatologic:  No open lesions noted bilateral foot, no rashes noted bilateral foot, nails 1 through 5 bilateral within normal limits of length and thickness          Assessment:       Encounter Diagnoses   Name Primary?    Plantar fasciitis     Fibromyalgia affecting lower leg Yes    Left foot pain          Plan:       Urszula was seen today for foot pain.    Diagnoses and all orders for this visit:    Fibromyalgia affecting lower leg    Plantar fasciitis    Left foot pain      I counseled the patient on her conditions, their implications and medical management.        1. Patient was examined and evaluated  2. Discussed with patient etiology of plantar fasciitis.  Discussed conservative treatment options with patient.  Discussed oral NSAIDs, local steroid injection, shoe gear modification, and rest.  Patient was advised to continue with current stretching regimen.  Patient will continue with oral diclofenac for improvement of her plantar fasciitis symptoms.   3. Patient was advised to consider use of athletic shoes only until the foot improves.   4. Discussed with patient possible contribution of fibromyalgia to her current foot symptoms.  Patient will continue with previous oral treatments for some of her fibromyalgia complaints.  Patient will continue with oral Neurontin for improvement of nerve related complaints.  5. Patient will follow-up in 3 weeks p.r.n. for complaints

## 2023-03-20 ENCOUNTER — OFFICE VISIT (OUTPATIENT)
Dept: PODIATRY | Facility: CLINIC | Age: 63
End: 2023-03-20
Payer: OTHER GOVERNMENT

## 2023-03-20 DIAGNOSIS — M72.2 PLANTAR FASCIITIS: Primary | ICD-10-CM

## 2023-03-20 DIAGNOSIS — M79.672 LEFT FOOT PAIN: ICD-10-CM

## 2023-03-20 DIAGNOSIS — R26.2 DIFFICULTY WALKING: ICD-10-CM

## 2023-03-20 DIAGNOSIS — M20.12 HALLUX VALGUS OF LEFT FOOT: ICD-10-CM

## 2023-03-20 PROCEDURE — 99213 OFFICE O/P EST LOW 20 MIN: CPT | Mod: S$GLB,,, | Performed by: PODIATRIST

## 2023-03-20 PROCEDURE — 99213 PR OFFICE/OUTPT VISIT, EST, LEVL III, 20-29 MIN: ICD-10-PCS | Mod: S$GLB,,, | Performed by: PODIATRIST

## 2023-03-28 NOTE — PROGRESS NOTES
Subjective:      Patient ID: Urszula Dupree is a 62 y.o. female.    Chief Complaint: Follow-up and Foot Pain    Urszula is a 62 y.o. female who presents to the clinic complaining of heel pain in the left foot, especially with the first step in the morning. The pain is described as Throbbing, Tight, Sharp, Electric, and Shooting. The onset of the pain was gradual and has worsened slightly over the past several months. Urszula rates the pain as 6/10. She denies a history of trauma. Prior treatments include local corticosteroid injection which was effective, low-dye ankle/ foot strapping which was mildly effective and use of comfortable shoes.     Review of Systems   Constitutional: Negative for chills and fever.   Cardiovascular:  Negative for chest pain and leg swelling.   Respiratory:  Negative for cough and shortness of breath.    Gastrointestinal:  Negative for diarrhea, nausea and vomiting.         Objective:      Physical Exam  Vitals reviewed.   Constitutional:       General: She is not in acute distress.     Appearance: Normal appearance. She is not ill-appearing.   HENT:      Head: Normocephalic.      Nose: Nose normal.   Cardiovascular:      Rate and Rhythm: Normal rate.   Pulmonary:      Effort: Pulmonary effort is normal. No respiratory distress.   Skin:     Capillary Refill: Capillary refill takes 2 to 3 seconds.   Neurological:      Mental Status: She is alert and oriented to person, place, and time.   Psychiatric:         Mood and Affect: Mood normal.         Behavior: Behavior normal.         Thought Content: Thought content normal.         Judgment: Judgment normal.     Musculoskeletal:  5/5 muscle strength noted bilateral foot, medial deviation of bilateral 1st MPJ with prominent dorsal medial eminence left foot greater than right indicative of bunion deformity, minimal pain and tenderness with palpation plantar aspect of the left foot at the calcaneal medial tubercle and along the medial band of the  plantar fascia at the medial arch, no masses noted bilateral foot  Neurologic:  Protective and light touch sensation intact bilateral lower extremity, positive paresthesias reported left foot   Vascular:  DP and PT pulses palpable 2/4 bilateral foot, capillary refill time less than 3 seconds to the digits   Dermatologic:  No open lesions noted bilateral foot, no rashes noted bilateral foot, nails 1 through 5 bilateral within normal limits of length and thickness        Assessment:       Encounter Diagnoses   Name Primary?    Plantar fasciitis Yes    Left foot pain     Difficulty walking     Hallux valgus of left foot          Plan:       Urszula was seen today for follow-up and foot pain.    Diagnoses and all orders for this visit:    Plantar fasciitis    Left foot pain    Difficulty walking    Hallux valgus of left foot      I counseled the patient on her conditions, their implications and medical management.        1. Patient was examined and evaluated  2. Discussed with patient etiology of plantar fasciitis.  Discussed conservative treatment options with patient.  Discussed oral NSAIDs, local steroid injection, shoe gear modification, and rest.  Patient was advised to continue with current stretching regimen.  Patient will continue with oral diclofenac for improvement of her plantar fasciitis symptoms.   3. Patient was advised to consider use of athletic shoes only until the foot improves.   4. Discussed with patient possible contribution of fibromyalgia to her current foot symptoms.  Patient will continue with previous oral treatments for some of her fibromyalgia complaints.  Patient will continue with oral Neurontin for improvement of nerve related complaints.  5. Patient will follow-up p.r.n. for complaints

## 2023-05-09 ENCOUNTER — PATIENT MESSAGE (OUTPATIENT)
Dept: RESEARCH | Facility: HOSPITAL | Age: 63
End: 2023-05-09
Payer: OTHER GOVERNMENT